# Patient Record
Sex: FEMALE | Race: WHITE | NOT HISPANIC OR LATINO
[De-identification: names, ages, dates, MRNs, and addresses within clinical notes are randomized per-mention and may not be internally consistent; named-entity substitution may affect disease eponyms.]

---

## 2019-02-08 ENCOUNTER — APPOINTMENT (OUTPATIENT)
Dept: CT IMAGING | Facility: CLINIC | Age: 84
End: 2019-02-08

## 2019-03-07 ENCOUNTER — APPOINTMENT (OUTPATIENT)
Dept: CT IMAGING | Facility: CLINIC | Age: 84
End: 2019-03-07
Payer: MEDICARE

## 2019-03-07 ENCOUNTER — OUTPATIENT (OUTPATIENT)
Dept: OUTPATIENT SERVICES | Facility: HOSPITAL | Age: 84
LOS: 1 days | End: 2019-03-07
Payer: MEDICARE

## 2019-03-07 DIAGNOSIS — Z00.8 ENCOUNTER FOR OTHER GENERAL EXAMINATION: ICD-10-CM

## 2019-03-07 DIAGNOSIS — R19.7 DIARRHEA, UNSPECIFIED: ICD-10-CM

## 2019-03-07 PROCEDURE — 74261 CT COLONOGRAPHY DX: CPT

## 2019-03-07 PROCEDURE — 74261 CT COLONOGRAPHY DX: CPT | Mod: 26

## 2022-05-24 ENCOUNTER — APPOINTMENT (OUTPATIENT)
Dept: ORTHOPEDIC SURGERY | Facility: CLINIC | Age: 87
End: 2022-05-24
Payer: MEDICARE

## 2022-05-24 VITALS — WEIGHT: 130 LBS | HEIGHT: 62 IN | BODY MASS INDEX: 23.92 KG/M2

## 2022-05-24 DIAGNOSIS — I10 ESSENTIAL (PRIMARY) HYPERTENSION: ICD-10-CM

## 2022-05-24 PROCEDURE — J3490M: CUSTOM

## 2022-05-24 PROCEDURE — 99214 OFFICE O/P EST MOD 30 MIN: CPT | Mod: 25

## 2022-05-24 PROCEDURE — 20610 DRAIN/INJ JOINT/BURSA W/O US: CPT | Mod: LT

## 2022-05-24 NOTE — DISCUSSION/SUMMARY
[de-identified] : Instructions:  Progress Note completed by Shivani Charles PA-C\par * Dr. Vincent -- The documentation recorded accurately reflects the decisions made by me during this visit.

## 2022-05-24 NOTE — ASSESSMENT
[FreeTextEntry1] : bilateral knee pain with oa.  uses walker.  has braces.  csi on 2/10/22. \par \par also bilateral IT band tightness/tendonitis.

## 2022-05-24 NOTE — HISTORY OF PRESENT ILLNESS
[5] : 5 [0] : 0 [Dull/Aching] : dull/aching [Sharp] : sharp [Retired] : Work status: retired [de-identified] : 5/24/22:  follow up bilateral knees.  reports to pain with walking.  csi on 2/10/22.  [] : Post Surgical Visit: no [FreeTextEntry1] : leia knees

## 2022-05-24 NOTE — PROCEDURE
[FreeTextEntry3] : Procedure Name: Large Joint Injection / Aspiration: Celestone, Lidocaine and Marcaine. \par \par Large Joint Injection was performed because of pain and inflammation. Anesthesia: ethyl chloride sprayed topically.. \par Celestone: An injection of Celestone 6 mg , 1 cc. \par Lidocaine 1%: 3 cc.\par Marcaine 0.25%:  3 cc.\par \par Patient has tried OTC's including aspirin, Ibuprofen, Aleve etc or prescription NSAIDS, and/or exercises at home and/ or physical therapy without satisfactory response and Patient has decreased mobility in the joint. The risks, benefits, and alternatives to cortisone injection were explained in full to the patient. Risks outlined include but are not limited to infection, sepsis, bleeding, scarring, skin discoloration, temporary increase in pain, syncopal episode, failure to resolve symptoms, allergic reaction, symptom recurrence, and elevation of blood sugar in diabetics. Patient understood the risks. All questions were answered.   Oral informed consent was obtained.   Sterile technique was utilized for the procedure including the preparation of the solutions used for the injection. Medication was injected in the BILATERAL KNEES.   Patient tolerated the procedure well. Advised to ice the injection site this evening.  Post Procedure Instructions: Patient was advised to call if redness, pain, or fever occur and apply ice for 15 min. out of every hour for the next 12-24 hours as tolerated. patient was advised to rest the joint(s) for 2 days. \par

## 2022-05-24 NOTE — PHYSICAL EXAM
[Left] : left knee [Right] : right knee [NL (0)] : extension 0 degrees [5___] : hamstring 5[unfilled]/5 [] : no erythema [TWNoteComboBox7] : flexion 110 degrees

## 2023-01-17 ENCOUNTER — APPOINTMENT (OUTPATIENT)
Dept: ORTHOPEDIC SURGERY | Facility: CLINIC | Age: 88
End: 2023-01-17
Payer: MEDICARE

## 2023-01-17 VITALS — HEIGHT: 62 IN | BODY MASS INDEX: 23.92 KG/M2 | WEIGHT: 130 LBS

## 2023-01-17 PROCEDURE — 99214 OFFICE O/P EST MOD 30 MIN: CPT | Mod: 25

## 2023-01-17 PROCEDURE — 20610 DRAIN/INJ JOINT/BURSA W/O US: CPT | Mod: 50

## 2023-01-17 NOTE — DISCUSSION/SUMMARY
[de-identified] : Instructions:  Progress Note completed by Shivani Charles PA-C\par * Dr. Vincent -- The documentation recorded accurately reflects the decisions made by me during this visit.

## 2023-01-17 NOTE — PHYSICAL EXAM
[Left] : left knee [Right] : right knee [NL (0)] : extension 0 degrees [5___] : hamstring 5[unfilled]/5 [] : uses walker [TWNoteComboBox7] : flexion 110 degrees

## 2023-01-17 NOTE — PROCEDURE
[FreeTextEntry3] : Procedure Name: ORTHOVISC (Large Joint)\par \par Viscosupplementation Injection: X-ray evidence of Osteoarthritis on this or prior visit and Patient has tried OTC's including aspirin, Ibuprofen, Aleve etc or prescription NSAIDS, and/or exercises at home and/ or physical therapy without satisfactory response.  The risks, benefits, and alternatives to Viscosupplementation injection were explained in full to the patient. Risks outlined include but are not limited to infection, sepsis, bleeding, scarring, skin discoloration, temporary increase in pain, syncopal episode, failure to resolve symptoms, allergic reaction, and symptom recurrence. Signs and symptoms of infection reviewed and patient advised to call immediately for redness, fevers, and/or chills. Patient understood the risks. All questions were answered. \par \par Oral informed consent was obtained.   Sterile technique was utilized for the procedure including the preparation of the solutions used for the injection. An injection of Orthovisc 2ml #1 was injected into BILATERAL KNEES.  Patient tolerated the procedure well. Advised to ice the injection site this evening.  Post Procedure Instructions: Patient was advised to call if redness, pain, or fever occur and apply ice for 15 min. out of every hour for the next 12-24 hours as tolerated. patient was advised to rest the joint(s) for 2 days.

## 2023-01-17 NOTE — HISTORY OF PRESENT ILLNESS
[8] : 8 [3] : 3 [Dull/Aching] : dull/aching [Sharp] : sharp [de-identified] : 5/24/22:  follow up bilateral knees.  reports to pain with walking.  csi on 2/10/22. \par 1/17/23:  bilateral knee pain.  [] : Post Surgical Visit: no [FreeTextEntry1] : leia knees [de-identified] : physical therapy

## 2023-01-26 ENCOUNTER — APPOINTMENT (OUTPATIENT)
Dept: ORTHOPEDIC SURGERY | Facility: CLINIC | Age: 88
End: 2023-01-26

## 2023-02-02 ENCOUNTER — APPOINTMENT (OUTPATIENT)
Dept: ORTHOPEDIC SURGERY | Facility: CLINIC | Age: 88
End: 2023-02-02
Payer: MEDICARE

## 2023-02-02 VITALS — HEIGHT: 62 IN | WEIGHT: 130 LBS | BODY MASS INDEX: 23.92 KG/M2

## 2023-02-02 PROCEDURE — 20610 DRAIN/INJ JOINT/BURSA W/O US: CPT | Mod: 50

## 2023-02-02 PROCEDURE — 99212 OFFICE O/P EST SF 10 MIN: CPT | Mod: 25

## 2023-02-02 NOTE — HISTORY OF PRESENT ILLNESS
[] : This patient has had an injection before: yes [2] : 2 [Orthovisc] : Orthovisc [de-identified] : 5/24/22:  follow up bilateral knees.  reports to pain with walking.  csi on 2/10/22. \par 1/17/23:  bilateral knee pain. \par 2/2/23:  bilateral knee pain.   no adverse reactions from first set.  [FreeTextEntry1] : bilateral knees  [de-identified] : 01/24/22

## 2023-02-02 NOTE — DISCUSSION/SUMMARY
[de-identified] : Instructions:  Progress Note completed by Shivani Charles PA-C\par * Dr. Vincent -- The documentation recorded accurately reflects the decisions made by me during this visit.

## 2023-02-02 NOTE — PROCEDURE
[FreeTextEntry3] : Procedure Name: ORTHOVISC (Large Joint)\par \par Viscosupplementation Injection: X-ray evidence of Osteoarthritis on this or prior visit and Patient has tried OTC's including aspirin, Ibuprofen, Aleve etc or prescription NSAIDS, and/or exercises at home and/ or physical therapy without satisfactory response.  The risks, benefits, and alternatives to Viscosupplementation injection were explained in full to the patient. Risks outlined include but are not limited to infection, sepsis, bleeding, scarring, skin discoloration, temporary increase in pain, syncopal episode, failure to resolve symptoms, allergic reaction, and symptom recurrence. Signs and symptoms of infection reviewed and patient advised to call immediately for redness, fevers, and/or chills. Patient understood the risks. All questions were answered. \par \par Oral informed consent was obtained.   Sterile technique was utilized for the procedure including the preparation of the solutions used for the injection. An injection of Orthovisc 2ml #2 was injected into BILATERAL KNEES.  Patient tolerated the procedure well. Advised to ice the injection site this evening.  Post Procedure Instructions: Patient was advised to call if redness, pain, or fever occur and apply ice for 15 min. out of every hour for the next 12-24 hours as tolerated. patient was advised to rest the joint(s) for 2 days.

## 2023-02-09 ENCOUNTER — APPOINTMENT (OUTPATIENT)
Dept: ORTHOPEDIC SURGERY | Facility: CLINIC | Age: 88
End: 2023-02-09
Payer: MEDICARE

## 2023-02-09 VITALS — BODY MASS INDEX: 23.92 KG/M2 | HEIGHT: 62 IN | WEIGHT: 130 LBS

## 2023-02-09 PROCEDURE — 99212 OFFICE O/P EST SF 10 MIN: CPT | Mod: 25

## 2023-02-09 PROCEDURE — 20610 DRAIN/INJ JOINT/BURSA W/O US: CPT | Mod: 50

## 2023-02-09 NOTE — DISCUSSION/SUMMARY
[de-identified] : Instructions:  Progress Note completed by Shivani Charles PA-C\par * Dr. Vincent -- The documentation recorded accurately reflects the decisions made by me during this visit.

## 2023-02-09 NOTE — PROCEDURE
[FreeTextEntry3] : Procedure Name: ORTHOVISC (Large Joint)\par \par Viscosupplementation Injection: X-ray evidence of Osteoarthritis on this or prior visit and Patient has tried OTC's including aspirin, Ibuprofen, Aleve etc or prescription NSAIDS, and/or exercises at home and/ or physical therapy without satisfactory response.  The risks, benefits, and alternatives to Viscosupplementation injection were explained in full to the patient. Risks outlined include but are not limited to infection, sepsis, bleeding, scarring, skin discoloration, temporary increase in pain, syncopal episode, failure to resolve symptoms, allergic reaction, and symptom recurrence. Signs and symptoms of infection reviewed and patient advised to call immediately for redness, fevers, and/or chills. Patient understood the risks. All questions were answered. \par \par Oral informed consent was obtained.   Sterile technique was utilized for the procedure including the preparation of the solutions used for the injection. An injection of Orthovisc 2ml #3 was injected into BILATERAL KNEES.  Patient tolerated the procedure well. Advised to ice the injection site this evening.  Post Procedure Instructions: Patient was advised to call if redness, pain, or fever occur and apply ice for 15 min. out of every hour for the next 12-24 hours as tolerated. patient was advised to rest the joint(s) for 2 days.

## 2023-02-09 NOTE — HISTORY OF PRESENT ILLNESS
[] : This patient has had an injection before: yes [3] : 3 [Orthovisc] : Orthovisc [de-identified] : 5/24/22:  follow up bilateral knees.  reports to pain with walking.  csi on 2/10/22. \par 1/17/23:  bilateral knee pain. \par 2/2/23:  bilateral knee pain.   no adverse reactions from first set. \par 2/9/23:  bilateral knee pain.  [FreeTextEntry1] : bilateral knees

## 2023-02-16 ENCOUNTER — APPOINTMENT (OUTPATIENT)
Dept: ORTHOPEDIC SURGERY | Facility: CLINIC | Age: 88
End: 2023-02-16
Payer: MEDICARE

## 2023-02-16 VITALS — BODY MASS INDEX: 23.92 KG/M2 | HEIGHT: 62 IN | WEIGHT: 130 LBS

## 2023-02-16 PROCEDURE — 99212 OFFICE O/P EST SF 10 MIN: CPT | Mod: 25

## 2023-02-16 PROCEDURE — 20610 DRAIN/INJ JOINT/BURSA W/O US: CPT | Mod: 50

## 2023-02-16 NOTE — HISTORY OF PRESENT ILLNESS
[] : This patient has had an injection before: yes [Orthovisc] : Orthovisc [3] : 3 [de-identified] : 5/24/22:  follow up bilateral knees.  reports to pain with walking.  csi on 2/10/22. \par 1/17/23:  bilateral knee pain. \par 2/2/23:  bilateral knee pain.   no adverse reactions from first set. \par 2/9/23:  bilateral knee pain. \par 2/16/23: B knee orthovisc #4 [FreeTextEntry1] : bilateral knees  [de-identified] : 02/09/23

## 2023-02-16 NOTE — PROCEDURE
[FreeTextEntry3] : Procedure Name: ORTHOVISC (Large Joint)\par \par Viscosupplementation Injection: X-ray evidence of Osteoarthritis on this or prior visit and Patient has tried OTC's including aspirin, Ibuprofen, Aleve etc or prescription NSAIDS, and/or exercises at home and/ or physical therapy without satisfactory response.  The risks, benefits, and alternatives to Viscosupplementation injection were explained in full to the patient. Risks outlined include but are not limited to infection, sepsis, bleeding, scarring, skin discoloration, temporary increase in pain, syncopal episode, failure to resolve symptoms, allergic reaction, and symptom recurrence. Signs and symptoms of infection reviewed and patient advised to call immediately for redness, fevers, and/or chills. Patient understood the risks. All questions were answered. \par \par Oral informed consent was obtained.   Sterile technique was utilized for the procedure including the preparation of the solutions used for the injection. An injection of Orthovisc 2ml #4 was injected into BILATERAL KNEES.  Patient tolerated the procedure well. Advised to ice the injection site this evening.  Post Procedure Instructions: Patient was advised to call if redness, pain, or fever occur and apply ice for 15 min. out of every hour for the next 12-24 hours as tolerated. patient was advised to rest the joint(s) for 2 days.

## 2023-02-16 NOTE — DISCUSSION/SUMMARY
[de-identified] : Instructions:  Progress Note completed by JAKE Rosenthal PA-C\par * Dr. Vincent -- The documentation recorded accurately reflects the decisions made by me during this visit.

## 2023-07-25 ENCOUNTER — APPOINTMENT (OUTPATIENT)
Dept: ORTHOPEDIC SURGERY | Facility: CLINIC | Age: 88
End: 2023-07-25
Payer: MEDICARE

## 2023-07-25 VITALS — WEIGHT: 130 LBS | HEIGHT: 62 IN | BODY MASS INDEX: 23.92 KG/M2

## 2023-07-25 DIAGNOSIS — M17.11 UNILATERAL PRIMARY OSTEOARTHRITIS, RIGHT KNEE: ICD-10-CM

## 2023-07-25 DIAGNOSIS — M17.12 UNILATERAL PRIMARY OSTEOARTHRITIS, LEFT KNEE: ICD-10-CM

## 2023-07-25 PROCEDURE — J3490M: CUSTOM | Mod: NC

## 2023-07-25 PROCEDURE — 20610 DRAIN/INJ JOINT/BURSA W/O US: CPT | Mod: 50

## 2023-07-25 PROCEDURE — 99214 OFFICE O/P EST MOD 30 MIN: CPT | Mod: 25

## 2023-07-25 NOTE — PROCEDURE
[Large Joint Injection] : Large joint injection [Bilateral] : bilaterally of the [Knee] : knee [Pain] : pain [Alcohol] : alcohol [___ cc    3mg] :  Betamethasone (Celestone) ~Vcc of 3mg [Betadine] : betadine [___ cc    1%] : Lidocaine ~Vcc of 1%  [___ cc    0.25%] : Bupivacaine (Marcaine) ~Vcc of 0.25%  [] : Patient tolerated procedure well [Call if redness, pain or fever occur] : call if redness, pain or fever occur [Apply ice for 15min out of every hour for the next 12-24 hours as tolerated] : apply ice for 15 minutes out of every hour for the next 12-24 hours as tolerated [Patient was advised to rest the joint(s) for ____ days] : patient was advised to rest the joint(s) for [unfilled] days [Previous OTC use and PT nontherapeutic] : patient has tried OTC's including aspirin, Ibuprofen, Aleve, etc or prescription NSAIDS, and/or exercises at home and/or physical therapy without satisfactory response [Patient had decreased mobility in the joint] : patient had decreased mobility in the joint [Risks, benefits, alternatives discussed / Verbal consent obtained] : the risks benefits, and alternatives have been discussed, and verbal consent was obtained [All ultrasound images have been permanently captured and stored accordingly in our picture archiving and communication system] : All ultrasound images have been permanently captured and stored accordingly in our picture archiving and communication system [Visualization of the needle and placement of injection was performed without complication] : visualization of the needle and placement of injection was performed without complication [Inflammation] : inflammation [de-identified] : for accurate placement of needle into knee joint

## 2023-07-25 NOTE — HISTORY OF PRESENT ILLNESS
[8] : 8 [de-identified] : 5/24/22:  follow up bilateral knees.  reports to pain with walking.  csi on 2/10/22. \par 1/17/23:  bilateral knee pain. \par 2/2/23:  bilateral knee pain.   no adverse reactions from first set. \par 2/9/23:  bilateral knee pain. \par 2/16/23: B knee orthovisc #4\par 7/25/23:  bilateral knee pain has returned.  visco helped. [] : This patient has had an injection before: yes [3] : 3 [Orthovisc] : Orthovisc [FreeTextEntry1] : bilateral knees  [de-identified] : 02/09/23

## 2023-09-05 ENCOUNTER — APPOINTMENT (OUTPATIENT)
Dept: ORTHOPEDIC SURGERY | Facility: CLINIC | Age: 88
End: 2023-09-05

## 2024-01-21 ENCOUNTER — INPATIENT (INPATIENT)
Facility: HOSPITAL | Age: 89
LOS: 2 days | Discharge: NURSING, REHAB OR LONG-TERM CARE | End: 2024-01-24
Attending: INTERNAL MEDICINE | Admitting: INTERNAL MEDICINE
Payer: MEDICARE

## 2024-01-21 VITALS
DIASTOLIC BLOOD PRESSURE: 84 MMHG | HEIGHT: 66 IN | RESPIRATION RATE: 19 BRPM | OXYGEN SATURATION: 97 % | WEIGHT: 169.98 LBS | HEART RATE: 90 BPM | SYSTOLIC BLOOD PRESSURE: 145 MMHG | TEMPERATURE: 99 F

## 2024-01-21 LAB
ALBUMIN SERPL ELPH-MCNC: 3.2 G/DL — LOW (ref 3.3–5)
ALP SERPL-CCNC: 71 U/L — SIGNIFICANT CHANGE UP (ref 40–120)
ALT FLD-CCNC: 29 U/L — SIGNIFICANT CHANGE UP (ref 12–78)
ANION GAP SERPL CALC-SCNC: 6 MMOL/L — SIGNIFICANT CHANGE UP (ref 5–17)
APPEARANCE UR: CLEAR — SIGNIFICANT CHANGE UP
APTT BLD: 29 SEC — SIGNIFICANT CHANGE UP (ref 24.5–35.6)
AST SERPL-CCNC: 32 U/L — SIGNIFICANT CHANGE UP (ref 15–37)
BASOPHILS # BLD AUTO: 0.07 K/UL — SIGNIFICANT CHANGE UP (ref 0–0.2)
BASOPHILS NFR BLD AUTO: 0.7 % — SIGNIFICANT CHANGE UP (ref 0–2)
BILIRUB SERPL-MCNC: 0.9 MG/DL — SIGNIFICANT CHANGE UP (ref 0.2–1.2)
BILIRUB UR-MCNC: NEGATIVE — SIGNIFICANT CHANGE UP
BUN SERPL-MCNC: 21 MG/DL — SIGNIFICANT CHANGE UP (ref 7–23)
CALCIUM SERPL-MCNC: 9 MG/DL — SIGNIFICANT CHANGE UP (ref 8.5–10.1)
CHLORIDE SERPL-SCNC: 105 MMOL/L — SIGNIFICANT CHANGE UP (ref 96–108)
CHOLEST SERPL-MCNC: 182 MG/DL — SIGNIFICANT CHANGE UP
CO2 SERPL-SCNC: 30 MMOL/L — SIGNIFICANT CHANGE UP (ref 22–31)
COLOR SPEC: YELLOW — SIGNIFICANT CHANGE UP
CREAT SERPL-MCNC: 0.75 MG/DL — SIGNIFICANT CHANGE UP (ref 0.5–1.3)
DIFF PNL FLD: NEGATIVE — SIGNIFICANT CHANGE UP
EGFR: 73 ML/MIN/1.73M2 — SIGNIFICANT CHANGE UP
EOSINOPHIL # BLD AUTO: 0.37 K/UL — SIGNIFICANT CHANGE UP (ref 0–0.5)
EOSINOPHIL NFR BLD AUTO: 3.6 % — SIGNIFICANT CHANGE UP (ref 0–6)
FLUAV AG NPH QL: SIGNIFICANT CHANGE UP
FLUBV AG NPH QL: SIGNIFICANT CHANGE UP
GLUCOSE BLDC GLUCOMTR-MCNC: 86 MG/DL — SIGNIFICANT CHANGE UP (ref 70–99)
GLUCOSE SERPL-MCNC: 88 MG/DL — SIGNIFICANT CHANGE UP (ref 70–99)
GLUCOSE UR QL: NEGATIVE MG/DL — SIGNIFICANT CHANGE UP
HCT VFR BLD CALC: 49.2 % — HIGH (ref 34.5–45)
HDLC SERPL-MCNC: 48 MG/DL — LOW
HGB BLD-MCNC: 16.3 G/DL — HIGH (ref 11.5–15.5)
IMM GRANULOCYTES NFR BLD AUTO: 0.7 % — SIGNIFICANT CHANGE UP (ref 0–0.9)
INR BLD: 1 RATIO — SIGNIFICANT CHANGE UP (ref 0.85–1.18)
KETONES UR-MCNC: NEGATIVE MG/DL — SIGNIFICANT CHANGE UP
LEUKOCYTE ESTERASE UR-ACNC: NEGATIVE — SIGNIFICANT CHANGE UP
LIPID PNL WITH DIRECT LDL SERPL: 109 MG/DL — HIGH
LYMPHOCYTES # BLD AUTO: 0.9 K/UL — LOW (ref 1–3.3)
LYMPHOCYTES # BLD AUTO: 8.8 % — LOW (ref 13–44)
MCHC RBC-ENTMCNC: 31.8 PG — SIGNIFICANT CHANGE UP (ref 27–34)
MCHC RBC-ENTMCNC: 33.1 G/DL — SIGNIFICANT CHANGE UP (ref 32–36)
MCV RBC AUTO: 95.9 FL — SIGNIFICANT CHANGE UP (ref 80–100)
MONOCYTES # BLD AUTO: 0.88 K/UL — SIGNIFICANT CHANGE UP (ref 0–0.9)
MONOCYTES NFR BLD AUTO: 8.6 % — SIGNIFICANT CHANGE UP (ref 2–14)
NEUTROPHILS # BLD AUTO: 7.98 K/UL — HIGH (ref 1.8–7.4)
NEUTROPHILS NFR BLD AUTO: 77.6 % — HIGH (ref 43–77)
NITRITE UR-MCNC: NEGATIVE — SIGNIFICANT CHANGE UP
NON HDL CHOLESTEROL: 134 MG/DL — HIGH
NRBC # BLD: 0 /100 WBCS — SIGNIFICANT CHANGE UP (ref 0–0)
PH UR: 7.5 — SIGNIFICANT CHANGE UP (ref 5–8)
PLATELET # BLD AUTO: 158 K/UL — SIGNIFICANT CHANGE UP (ref 150–400)
POTASSIUM SERPL-MCNC: 4.7 MMOL/L — SIGNIFICANT CHANGE UP (ref 3.5–5.3)
POTASSIUM SERPL-SCNC: 4.7 MMOL/L — SIGNIFICANT CHANGE UP (ref 3.5–5.3)
PROT SERPL-MCNC: 6.9 GM/DL — SIGNIFICANT CHANGE UP (ref 6–8.3)
PROT UR-MCNC: SIGNIFICANT CHANGE UP MG/DL
PROTHROM AB SERPL-ACNC: 12 SEC — SIGNIFICANT CHANGE UP (ref 9.5–13)
RBC # BLD: 5.13 M/UL — SIGNIFICANT CHANGE UP (ref 3.8–5.2)
RBC # FLD: 13.5 % — SIGNIFICANT CHANGE UP (ref 10.3–14.5)
SARS-COV-2 RNA SPEC QL NAA+PROBE: DETECTED
SODIUM SERPL-SCNC: 141 MMOL/L — SIGNIFICANT CHANGE UP (ref 135–145)
SP GR SPEC: >1.03 — HIGH (ref 1–1.03)
TRIGL SERPL-MCNC: 143 MG/DL — SIGNIFICANT CHANGE UP
TROPONIN I, HIGH SENSITIVITY RESULT: 22.7 NG/L — SIGNIFICANT CHANGE UP
UROBILINOGEN FLD QL: 1 MG/DL — SIGNIFICANT CHANGE UP (ref 0.2–1)
WBC # BLD: 10.27 K/UL — SIGNIFICANT CHANGE UP (ref 3.8–10.5)
WBC # FLD AUTO: 10.27 K/UL — SIGNIFICANT CHANGE UP (ref 3.8–10.5)

## 2024-01-21 PROCEDURE — 99222 1ST HOSP IP/OBS MODERATE 55: CPT

## 2024-01-21 PROCEDURE — 70496 CT ANGIOGRAPHY HEAD: CPT | Mod: 26,MA

## 2024-01-21 PROCEDURE — 99223 1ST HOSP IP/OBS HIGH 75: CPT | Mod: AI

## 2024-01-21 PROCEDURE — 0042T: CPT | Mod: MA

## 2024-01-21 PROCEDURE — 93010 ELECTROCARDIOGRAM REPORT: CPT

## 2024-01-21 PROCEDURE — 70450 CT HEAD/BRAIN W/O DYE: CPT | Mod: 26,MA,XU

## 2024-01-21 PROCEDURE — 70498 CT ANGIOGRAPHY NECK: CPT | Mod: 26,MA

## 2024-01-21 PROCEDURE — 99291 CRITICAL CARE FIRST HOUR: CPT

## 2024-01-21 RX ORDER — ASPIRIN/CALCIUM CARB/MAGNESIUM 324 MG
81 TABLET ORAL DAILY
Refills: 0 | Status: DISCONTINUED | OUTPATIENT
Start: 2024-01-21 | End: 2024-01-22

## 2024-01-21 RX ORDER — SODIUM CHLORIDE 9 MG/ML
1000 INJECTION, SOLUTION INTRAVENOUS
Refills: 0 | Status: DISCONTINUED | OUTPATIENT
Start: 2024-01-21 | End: 2024-01-24

## 2024-01-21 RX ORDER — ATORVASTATIN CALCIUM 80 MG/1
40 TABLET, FILM COATED ORAL AT BEDTIME
Refills: 0 | Status: DISCONTINUED | OUTPATIENT
Start: 2024-01-21 | End: 2024-01-22

## 2024-01-21 RX ORDER — METOPROLOL TARTRATE 50 MG
5 TABLET ORAL EVERY 6 HOURS
Refills: 0 | Status: DISCONTINUED | OUTPATIENT
Start: 2024-01-21 | End: 2024-01-24

## 2024-01-21 RX ORDER — HEPARIN SODIUM 5000 [USP'U]/ML
5000 INJECTION INTRAVENOUS; SUBCUTANEOUS EVERY 8 HOURS
Refills: 0 | Status: DISCONTINUED | OUTPATIENT
Start: 2024-01-21 | End: 2024-01-24

## 2024-01-21 RX ORDER — ASPIRIN/CALCIUM CARB/MAGNESIUM 324 MG
300 TABLET ORAL ONCE
Refills: 0 | Status: COMPLETED | OUTPATIENT
Start: 2024-01-21 | End: 2024-01-21

## 2024-01-21 RX ADMIN — Medication 5 MILLIGRAM(S): at 18:48

## 2024-01-21 RX ADMIN — HEPARIN SODIUM 5000 UNIT(S): 5000 INJECTION INTRAVENOUS; SUBCUTANEOUS at 21:37

## 2024-01-21 RX ADMIN — HEPARIN SODIUM 5000 UNIT(S): 5000 INJECTION INTRAVENOUS; SUBCUTANEOUS at 14:17

## 2024-01-21 RX ADMIN — SODIUM CHLORIDE 60 MILLILITER(S): 9 INJECTION, SOLUTION INTRAVENOUS at 15:21

## 2024-01-21 RX ADMIN — Medication 300 MILLIGRAM(S): at 10:52

## 2024-01-21 RX ADMIN — SODIUM CHLORIDE 60 MILLILITER(S): 9 INJECTION, SOLUTION INTRAVENOUS at 21:37

## 2024-01-21 NOTE — ED PROVIDER NOTE - PHYSICAL EXAMINATION
Gen: alert but oriented x 0-1  Head: NCAT  ENT: Airway patent, moist mucous membranes, nasal passageways clear   Cardiac: Normal rate,  irregular rhythm   Respiratory: Lungs CTA B/L  Gastrointestinal: Abdomen soft, nontender, nondistended, no rebound, no guarding  MSK: No gross abnormalities, FROM of all four extremities, no edema  HEME: Extremities warm and well perfused   Skin: No rashes, no lesions  Neuro: Unable to assess gait - + no movement R arm or R leg, + mild drift left hand but cannot fully assess as pt does not fully follow commands but strength grossly wnl, + mild drift left leg + mild movement left leg + Left gaze forced deviation, + aphasic/ mute

## 2024-01-21 NOTE — ED ADULT NURSE REASSESSMENT NOTE - NS ED NURSE REASSESS COMMENT FT1
Pt resting comfortably at this time. No s/s of pain noted. Continuous SPO2 and cardiac monitoring in place, fluids infusing per order. Son at bedside and oral care performed. Awaiting bed for admission, plan of care ongoing.

## 2024-01-21 NOTE — CONSULT NOTE ADULT - ASSESSMENT
Exam findings indicative of a likely extensive L MCA territory stroke.  Radiographic findings indicate early changes of ischemic infarct in that territory.      Suspect embolic etiology.         Exam findings indicative of a likely extensive L MCA territory stroke.  Radiographic findings indicate early changes of ischemic infarct in that territory.      Not a "small vessel" infarct.    She has been started on ASA. Antiplatelet Rx unlikley to be relevant for her stroke, therefore would not add a second antiplatelet agent (which would increase risk, without expected benefit).     Suspect embolic etiology.      MR would not  at this point.    Risk of significant post-infarct cerebral edema developing in the next 2 to 5 days.     Discussed at length with sonn at bedside.       RECOMMENDATIONS    OK for now to continue ASA; no second antiplatelet agent.    TTE.    Cardiac telemetry.      Swallow evaluation.    Routine repeat non-con head CT 1/22/24 in the PM, or on 1/23/24.    STAT non-con head CT for neurologic deterioration.          Mauricio Meyers M.D.   - Department of Neurology  [facility] [employer] [facility]

## 2024-01-21 NOTE — H&P ADULT - ASSESSMENT
89 year old female PMH HTN, Sciatica, possible mild dementia (no official dx), who presents with complaint of AMS - this morning not speaking or following commands, lethargic, last known well at approx 9PM evening prior (~11 hours prior to arrival), reportedly generalized weakness/ decreased ambulation over 1 week and fatigue, does not ambulate much at baseline. Able to speak, converse at baseline. Today not speaking at all. No falls or trauma. Not on any blood thinners per family's knowledge. Recently treated for UTI.    Plan:  89 year old female PMH HTN, Sciatica, possible mild dementia (no official dx), who presents with complaint of AMS - this morning not speaking or following commands, lethargic, last known well at approx 9PM evening prior (~11 hours prior to arrival), reportedly generalized weakness/ decreased ambulation over 1 week and fatigue, does not ambulate much at baseline. Able to speak, converse at baseline. Today not speaking at all. No falls or trauma. Not on any blood thinners per family's knowledge. Recently treated for UTI.      Plan:  Admit to tele for AMS likely from new CVA. CT head notes new left MCA infract, no bleed, will likely have aphasia. Swallow eval requested, does not appear she will pass. IVF for now. Will not order brain MRI, not likely to . Change home Verapamil to IVP Lopressor, IVF while NPO until Swallow eval complete. Covid 19 positive, no treatment needed.     Added ASA and statin. Neuro was called.     Son agrees to full DNR/DNI, agree to Palliative Care eval and home hospice later this week.  Will sign MOLST form in AM.

## 2024-01-21 NOTE — ED PROVIDER NOTE - PROGRESS NOTE DETAILS
Bradley DO: after review of imaging/ discussion with interventional stroke neurology - not candidate for thrombectomy, d/w tele stroke DR Alford and recommending transfer to hospital with dedicated stroke unit Rbadley DO: d/w Dr Alford again, after discussion with neuro at [facility] and [facility], recommendation to admit here at [facility] with q4 hour neuro checks as no intervention offered, consulted neurology Dr Meyers who will see pt, family updated at bedside sons Kevin and Sae and daughter in law Bradley DO: after review of imaging/ discussion with interventional stroke neurology - not candidate for thrombectomy, d/w tele stroke DR Alford and will discuss w/ her director about poss transfer to hospital with dedicated stroke unit, recommended holding anticoagulation but ok for aspirin

## 2024-01-21 NOTE — ED ADULT NURSE NOTE - NURSING NEURO LEVEL OF CONSCIOUSNESS
How Severe Are Your Spot(S)?: mild
What Type Of Note Output Would You Prefer (Optional)?: Standard Output
What Is The Reason For Today's Visit?: Full Body Skin Examination
What Is The Reason For Today's Visit? (Being Monitored For X): concerning skin lesions on an annual basis
awake

## 2024-01-21 NOTE — ED ADULT NURSE NOTE - ED STAT RN HANDOFF DETAILS
Report endorsed to onceran Christy. Safety checks compld this shift/Safety rounds completed hourly.  IV sites checked +remains WDL. Meds given as ord with no s/s of adverse RXNs. Fall +skin precs in place. Any issues endorsed to onceran Christy for follow up.

## 2024-01-21 NOTE — ED ADULT NURSE NOTE - NSFALLFACTORS_ED_ALL_ED
Addended by: Deloris Borges on: 10/15/2021 11:08 AM     Modules accepted: Orders Confusion/AMS/Weakness

## 2024-01-21 NOTE — H&P ADULT - HISTORY OF PRESENT ILLNESS
89 year old female PMH HTN, Sciatica, possible mild dementia (no official dx), who presents with complaint of AMS - this morning not speaking or following commands, lethargic, last known well at approx 9PM evening prior (~11 hours prior to arrival), reportedly generalized weakness/ decreased ambulation over 1 week and fatigue, does not ambulate much at baseline. Able to speak, converse at baseline. Today not speaking at all. No falls or trauma. Not on any blood thinners per family's knowledge. Recently treated for UTI.  89 year old female PMH HTN, chronic A.fib has refused AC in the past, possible mild dementia (no official dx), who presents with complaint of AMS - this morning not speaking or following commands, lethargic, last known well at approx 9PM evening prior (~11 hours prior to arrival), reportedly generalized weakness/ decreased ambulation over 1 week and fatigue, does not ambulate much at baseline. Able to speak, converse at baseline. Today not speaking at all. No falls or trauma. Not on any blood thinners per family's knowledge. Recently treated for UTI. At baseline basically non-ambulatory, small distances with HHA at home.

## 2024-01-21 NOTE — ED ADULT TRIAGE NOTE - CHIEF COMPLAINT QUOTE
BIBA- from home  Went to be last night at 9pm normal- Usually AOX2 forgetful and verbal  This am unable to be awakened. Left facial droop and right arm drift as per EMS  EMS FS 99, Left hand #20g  HX HTN, neuropathy, CHF, on ATB for UTI, unknown allergy status

## 2024-01-21 NOTE — ED PROVIDER NOTE - OBJECTIVE STATEMENT
89F pmhx HTN, sciatica, possible mild dementia (no official dx), who presents with complaint of AMS - this morning not speaking or following commands, lethargic, last known well at approx 9PM evening prior (~11 hours prior to arrival), reportedly generalized weakness/ decreased ambulation over 1 week and fatigue, does not ambulate much at baseline independently but at baseline speaks/ converses. Today not speaking at all. No falls or trauma. Not on any blood thinners per family's knowledge. Recently treated for UTI

## 2024-01-21 NOTE — ED ADULT NURSE NOTE - OBJECTIVE STATEMENT
Pt BIBEMS code stroke at triage and assessed by Dr. Bradley. Per EMS pt went to bed last night as baseline and woke up this morning nonverbal, with L facial droop, L visual gaze, and R sided weakness. Per family pt has hx of Dementia and confused at times but normally able to speak and AOx1. EMS placed L top of hand IV #20 on field and second IV placed to L forearm #20 at triage by this RN. Pt not following commands at this time, unable to answer questions. PMH Dementia, HTN, CHF, per family on abx for current UTI.

## 2024-01-21 NOTE — CONSULT NOTE ADULT - SUBJECTIVE AND OBJECTIVE BOX
BIBEMS from home this morning.  History from Admission H&P    <Start of quote(s) from H&P>  "Reason for Admission: AMS, r/o CVA  History of Present Illness:   89 year old female PMH HTN, chronic A.fib has refused AC in the past, possible mild dementia (no official dx), who presents with complaint of AMS - this morning not speaking or following commands, lethargic, last known well at approx 9PM evening prior (~11 hours prior to arrival), reportedly generalized weakness/ decreased ambulation over 1 week and fatigue, does not ambulate much at baseline. Able to speak, converse at baseline. Today not speaking at all. No falls or trauma. Not on any blood thinners per family's knowledge. Recently treated for UTI. At baseline basically non-ambulatory, small distances with HHA at home.       Review of Systems:  Other Review of Systems: All other review of systems negative, except as noted in HPI   . . .     Home Medications:   * Outpatient Medication Status not yet specified   . . .   · Substance use	No"  <End of quote(s) from H&P>    Per radiology report of head CT, CTP, and CTAs:        EXAMINATION    Supine in bed in Tele unit.  Non-verbal. Does not follow verbal requests.  Gaze deviated to the L.  No visual threat response elicited.  Flaccid RUE.      Winces readily to irritating stimulation of the LUE and LLE, and withdraws.  Delayed and less obvious wincing to irritating stimulation of the RUE and RLE.  No RUE withdrawal; weak RLE withdrawal.

## 2024-01-21 NOTE — ED ADULT NURSE NOTE - NSFALLHARMRISKINTERV_ED_ALL_ED

## 2024-01-21 NOTE — H&P ADULT - NSHPLABSRESULTS_GEN_ALL_CORE
LABS:                        16.3   10.27 )-----------( 158      ( 21 Jan 2024 09:05 )             49.2     01-21    141  |  105  |  21  ----------------------------<  88  4.7   |  30  |  0.75    Ca    9.0      21 Jan 2024 09:05    TPro  6.9  /  Alb  3.2<L>  /  TBili  0.9  /  DBili  x   /  AST  32  /  ALT  29  /  AlkPhos  71  01-21    PT/INR - ( 21 Jan 2024 09:05 )   PT: 12.0 sec;   INR: 1.00 ratio         PTT - ( 21 Jan 2024 09:05 )  PTT:29.0 sec  Urinalysis Basic - ( 21 Jan 2024 09:05 )    Color: x / Appearance: x / SG: x / pH: x  Gluc: 88 mg/dL / Ketone: x  / Bili: x / Urobili: x   Blood: x / Protein: x / Nitrite: x   Leuk Esterase: x / RBC: x / WBC x   Sq Epi: x / Non Sq Epi: x / Bacteria: x          RADIOLOGY & ADDITIONAL TESTS:

## 2024-01-21 NOTE — ED PROVIDER NOTE - CLINICAL SUMMARY MEDICAL DECISION MAKING FREE TEXT BOX
89F pmhx HTN, sciatica, possible mild dementia (no official dx), who presents with complaint of AMS - this morning not speaking or following commands, lethargic, last known well at approx 9PM evening prior (~11 hours prior to arrival), reportedly generalized weakness/ decreased ambulation over 1 week and fatigue, does not ambulate much at baseline independently but at baseline speaks/ converses. Today not speaking at all. No falls or trauma. Not on any blood thinners per family's knowledge. Recently treated for UTI  - stroke code activated to rule out LVO, not TNK candidate, rule out underlying infcn/ metabolic derangements, afib on ekg - d/w neuro due to occlusion hold on AC but ok with starting asa, NPO, admission vs transfer

## 2024-01-21 NOTE — H&P ADULT - NSHPPHYSICALEXAM_GEN_ALL_CORE
PHYSICAL EXAMINATION:  Vital Signs Last 24 Hrs  T(C): 36.8 (21 Jan 2024 11:42), Max: 37.1 (21 Jan 2024 08:32)  T(F): 98.2 (21 Jan 2024 11:42), Max: 98.7 (21 Jan 2024 08:32)  HR: 82 (21 Jan 2024 11:42) (82 - 93)  BP: 163/108 (21 Jan 2024 11:42) (145/84 - 165/86)  BP(mean): --  RR: 18 (21 Jan 2024 11:42) (18 - 19)  SpO2: 97% (21 Jan 2024 11:42) (97% - 97%)    Parameters below as of 21 Jan 2024 08:32  Patient On (Oxygen Delivery Method): room air      CAPILLARY BLOOD GLUCOSE      POCT Blood Glucose.: 93 mg/dL (21 Jan 2024 08:30)      GENERAL: NAD,   ENMT: mucous membranes moist  NECK: supple, No JVD  CHEST/LUNG: clear to auscultation bilaterally; no rales, rhonchi, or wheezing b/l  HEART: normal S1, S2  ABDOMEN: BS+, soft, ND, NT   EXTREMITIES:  pulses palpable; no clubbing, cyanosis, or edema b/l LEs  NEURO: awake, alert, interactive; moves all extremities PHYSICAL EXAMINATION:  Vital Signs Last 24 Hrs  T(C): 36.8 (21 Jan 2024 11:42), Max: 37.1 (21 Jan 2024 08:32)  T(F): 98.2 (21 Jan 2024 11:42), Max: 98.7 (21 Jan 2024 08:32)  HR: 82 (21 Jan 2024 11:42) (82 - 93)  BP: 163/108 (21 Jan 2024 11:42) (145/84 - 165/86)  BP(mean): --  RR: 18 (21 Jan 2024 11:42) (18 - 19)  SpO2: 97% (21 Jan 2024 11:42) (97% - 97%)    Parameters below as of 21 Jan 2024 08:32  Patient On (Oxygen Delivery Method): room air      CAPILLARY BLOOD GLUCOSE      POCT Blood Glucose.: 93 mg/dL (21 Jan 2024 08:30)      GENERAL: NAD, seen in ER, son at bedside, non-verbal, eyes closed, not responding to name, no CP or SOB  Neuro status not fully assessable due to weakness and lethargy   ENMT: mucous membranes moist  NECK: supple, No JVD  CHEST/LUNG: clear to auscultation bilaterally; no rales, rhonchi, or wheezing b/l  HEART: normal S1, S2  ABDOMEN: BS+, soft, ND, NT   EXTREMITIES:  pulses palpable; no clubbing, cyanosis, or edema b/l LEs  NEURO: sleeping in bed, non-verbal

## 2024-01-22 DIAGNOSIS — U07.1 COVID-19: ICD-10-CM

## 2024-01-22 DIAGNOSIS — R53.81 OTHER MALAISE: ICD-10-CM

## 2024-01-22 DIAGNOSIS — Z51.5 ENCOUNTER FOR PALLIATIVE CARE: ICD-10-CM

## 2024-01-22 DIAGNOSIS — I63.9 CEREBRAL INFARCTION, UNSPECIFIED: ICD-10-CM

## 2024-01-22 LAB
CULTURE RESULTS: SIGNIFICANT CHANGE UP
SPECIMEN SOURCE: SIGNIFICANT CHANGE UP

## 2024-01-22 PROCEDURE — 99232 SBSQ HOSP IP/OBS MODERATE 35: CPT

## 2024-01-22 PROCEDURE — 99223 1ST HOSP IP/OBS HIGH 75: CPT | Mod: FS

## 2024-01-22 RX ADMIN — Medication 5 MILLIGRAM(S): at 05:32

## 2024-01-22 RX ADMIN — HEPARIN SODIUM 5000 UNIT(S): 5000 INJECTION INTRAVENOUS; SUBCUTANEOUS at 23:28

## 2024-01-22 RX ADMIN — SODIUM CHLORIDE 60 MILLILITER(S): 9 INJECTION, SOLUTION INTRAVENOUS at 08:18

## 2024-01-22 RX ADMIN — SODIUM CHLORIDE 60 MILLILITER(S): 9 INJECTION, SOLUTION INTRAVENOUS at 23:28

## 2024-01-22 RX ADMIN — HEPARIN SODIUM 5000 UNIT(S): 5000 INJECTION INTRAVENOUS; SUBCUTANEOUS at 13:01

## 2024-01-22 RX ADMIN — HEPARIN SODIUM 5000 UNIT(S): 5000 INJECTION INTRAVENOUS; SUBCUTANEOUS at 05:32

## 2024-01-22 RX ADMIN — Medication 5 MILLIGRAM(S): at 00:33

## 2024-01-22 RX ADMIN — Medication 5 MILLIGRAM(S): at 13:01

## 2024-01-22 RX ADMIN — Medication 5 MILLIGRAM(S): at 23:28

## 2024-01-22 RX ADMIN — Medication 5 MILLIGRAM(S): at 19:50

## 2024-01-22 NOTE — CONSULT NOTE ADULT - ASSESSMENT
89 year old female PMH of HTN, atrial fib (inconsistent AC use?), CVA presented to the ED for AMS.  Patient unable to follow commands, lethargic and unable to speak.  Patient with left MCA infarct.  Palliative care consulted for GOC.

## 2024-01-22 NOTE — ED ADULT NURSE REASSESSMENT NOTE - NS ED NURSE REASSESS COMMENT FT1
Pt received in stretcher with eyes closed, equal rise and fall of chest wall noted breathing unlabored. Pt responds to physical stimuli, nonverbal at this time and unable to follow commands. Pt remains connected to cardiac monitor, with son at bedside. Plan of care reviewed with son. Safety maintained, assessment ongoing.

## 2024-01-22 NOTE — ED ADULT NURSE REASSESSMENT NOTE - NS ED NURSE REASSESS COMMENT FT1
MD Ariel Zhao made aware of bladder scan results. Per MD order contact inhouse NP for escamilla placement order. Inhouse NP paged awaiting response.

## 2024-01-22 NOTE — CONSULT NOTE ADULT - PROBLEM SELECTOR RECOMMENDATION 9
CTH: Abnormal perfusion showing left middle cerebral artery territory acute infarct with surrounding penumbra as described above.  chronic microvascular ischemic changes   h/o atrial fib (documented inconsistent use of AC), COVID all contributing to prothrombotic state   lethargic, unable to speak, able to squeeze hand on command, right facial droop  NPO, speech and swallow eval pending, aspiration precautions  on asa and heparin sq

## 2024-01-22 NOTE — CONSULT NOTE ADULT - SUBJECTIVE AND OBJECTIVE BOX
HPI:  89 year old female PMH HTN, chronic A.fib has refused AC in the past, possible mild dementia (no official dx), who presents with complaint of AMS - this morning not speaking or following commands, lethargic, last known well at approx 9PM evening prior (~11 hours prior to arrival), reportedly generalized weakness/ decreased ambulation over 1 week and fatigue, does not ambulate much at baseline. Able to speak, converse at baseline. Today not speaking at all. No falls or trauma. Not on any blood thinners per family's knowledge. Recently treated for UTI. At baseline basically non-ambulatory, small distances with HHA at home.   (21 Jan 2024 12:58)    PERTINENT PM/SXH:       FAMILY HISTORY:    ITEMS NOT CHECKED ARE NOT PRESENT    SOCIAL HISTORY:   Significant other/partner:  [ ]  Children:  [ ]  Buddhist/Spirituality: Mu-ism  Substance hx:  [ ]   Tobacco hx:  [ ]   Alcohol hx: [ ]   Home Opioid hx:  [ ] I-Stop Reference No: Reference #: [id number]  Living Situation: [ ]Home  [ ]Long term care  [ ]Rehab [ ]Other    ADVANCE DIRECTIVES:    DNR  MOLST  [ ]  Living Will  [ ]   DECISION MAKER(s):  [ ] Health Care Proxy(s)  [x ] Surrogate(s)  [ ] Guardian           Name(s): Phone Number(s): Yuriy Covarrubias [phone]    BASELINE (I)ADL(s) (prior to admission):  [place]: [ ]Total  [ ] Moderate [ ]Dependent    Allergies    latex (Unknown)  penicillins (Unknown)    Intolerances    MEDICATIONS  (STANDING):  aspirin enteric coated 81 milliGRAM(s) Oral daily  atorvastatin 40 milliGRAM(s) Oral at bedtime  dextrose 5% + sodium chloride 0.45%. 1000 milliLiter(s) (60 mL/Hr) IV Continuous <Continuous>  heparin   Injectable 5000 Unit(s) SubCutaneous every 8 hours  metoprolol tartrate Injectable 5 milliGRAM(s) IV Push every 6 hours    MEDICATIONS  (PRN):    PRESENT SYMPTOMS: [ ]Unable to obtain due to poor mentation   Source if other than patient:  [ ]Family   [ ]Team     Pain: [ ] yes [ ] no  QOL impact -   Location -                    Aggravating factors -  Quality -  Radiation -  Timing-  Severity (0-10 scale):  Minimal acceptable level (0-10 scale):     PAIN AD Score:     [email or web address] (press ctrl +  left click to view)    Dyspnea:                           [ ]Mild [ ]Moderate [ ]Severe  Anxiety:                             [ ]Mild [ ]Moderate [ ]Severe  Fatigue:                             [ ]Mild [ ]Moderate [ ]Severe  Nausea:                             [ ]Mild [ ]Moderate [ ]Severe  Loss of appetite:              [ ]Mild [ ]Moderate [ ]Severe  Constipation:                    [ ]Mild [ ]Moderate [ ]Severe    Other Symptoms:  [ ]All other review of systems negative     Karnofsky Performance Score/Palliative Performance Status Version 2:       30-40  %    http://npcrc.org/files/news/palliative_performance_scale_ppsv2.pdf  PHYSICAL EXAM:  Vital Signs Last 24 Hrs  T(C): 36.8 (22 Jan 2024 05:35), Max: 37.1 (21 Jan 2024 23:58)  T(F): 98.2 (22 Jan 2024 05:35), Max: 98.7 (21 Jan 2024 23:58)  HR: 73 (22 Jan 2024 13:24) (70 - 90)  BP: 147/89 (22 Jan 2024 13:24) (142/79 - 155/90)  BP(mean): --  RR: 18 (22 Jan 2024 13:24) (15 - 21)  SpO2: 98% (22 Jan 2024 13:24) (97% - 100%)    Parameters below as of 22 Jan 2024 13:24  Patient On (Oxygen Delivery Method): room air     I&O's Summary  GENERAL:  [ ]Alert  [ ]Oriented x   [ x]Lethargic  [ ]Cachexia  [ ]Unarousable  [ ]Verbal  [ x]Non-Verbal  Behavioral:   [ ] Anxiety  [ ] Delirium [ ] Agitation [ ] Other  HEENT:  [ ]Normal   [x ]Dry mouth   [ ]ET Tube/Trach  [ ]Oral lesions  PULMONARY:   [ ]Clear [ ]Tachypnea  [ ]Audible excessive secretions   [ ]Rhonchi        [ ]Right [ ]Left [ ]Bilateral  [ ]Crackles        [ ]Right [ ]Left [ ]Bilateral  [ ]Wheezing     [ ]Right [ ]Left [ ]Bilateral  CARDIOVASCULAR:    [ ]Regular [x ]Irregular [ ]Tachy  [ ]Kristopher [ ]Murmur [ ]Other  GASTROINTESTINAL:  [x ]Soft  [ ]Distended   [ ]+BS  [ ]Non tender [ ]Tender  [ ]PEG [ ]OGT/ NGT  Last BM: 1/21/24 GENITOURINARY:  [ ]Normal [x ] Incontinent   [ ]Oliguria/Anuria   [ ]Cruz  MUSCULOSKELETAL:   [ ]Normal   [x ]Weakness  [ ]Bed/Wheelchair bound [ ]Edema  NEUROLOGIC:   [ ]No focal deficits  [x ] Cognitive impairment  [ ] Dysphagia [ ]Dysarthria [ ] Paresis [ ]Other   right sided facial droop  SKIN:   [ ]Normal   [ ]Pressure ulcer(s)  [ ]Rash    CRITICAL CARE:  [ ] Shock Present  [ ]Septic [ ]Cardiogenic [ ]Neurologic [ ]Hypovolemic  [ ]  Vasopressors [ ]  Inotropes   [ ] Respiratory failure present [ ] mechanical ventilation [ ] non-invasive ventilatory support [ ] High flow  [ ] Acute  [ ] Chronic [ ] Hypoxic  [ ] Hypercarbic [ ] Other  [ ] Other organ failure     LABS:                        16.3   10.27 )-----------( 158      ( 21 Jan 2024 09:05 )             49.2   01-21    141  |  105  |  21  ----------------------------<  88  4.7   |  30  |  0.75    Ca    9.0      21 Jan 2024 09:05    TPro  6.9  /  Alb  3.2<L>  /  TBili  0.9  /  DBili  x   /  AST  32  /  ALT  29  /  AlkPhos  71  01-21  PT/INR - ( 21 Jan 2024 09:05 )   PT: 12.0 sec;   INR: 1.00 ratio         PTT - ( 21 Jan 2024 09:05 )  PTT:29.0 sec    Urinalysis (01.21.24 @ 12:59)    Glucose Qualitative, Urine: Negative mg/dL   Blood, Urine: Negative   pH Urine: 7.5   Color: Yellow   Urine Appearance: Clear   Bilirubin: Negative   Ketone - Urine: Negative mg/dL   Specific Gravity: >1.030   Protein, Urine: Trace mg/dL   Urobilinogen: 1.0 mg/dL   Nitrite: Negative   Leukocyte Esterase Concentration: Negative    RADIOLOGY & ADDITIONAL STUDIES:  < from: CT Brain Perfusion Maps Stroke (01.21.24 @ 08:55) >  IMPRESSION:  Patient positioning is suboptimal. Streak artifact caused by contrast   bolus does degrade images of the left long and central neck including a   portion of the left common carotid artery as described above.  No acute intracranial hemorrhage or mass effect. Periventricular white   matter attenuation changes consistent with chronic microvascular ischemia   are asymmetric. Hypoattenuation is greater on the left with subtle   edematous changes involving portions of the left frontal lobe. Head   positioning is suboptimal but these are likely findings of early acute   left infarct. The intravascular arteries on noncontrast study are   hyperdense but this is bilateral.. These findings were discussed with Dr. Bradley at 8:56 AM and 9:03 AM on 1/21/2024.  Occlusion of the left middle cerebral artery M1 segment immediately after   the anterior temporal artery origin with some distal reconstitution as   described above. This finding was discussed with Dr. Bradley at 9:03 AM on   1/21/2024.  Abnormal perfusion showing left middle cerebral artery territory acute   infarct with surrounding penumbra as described above.  Large left thyroid lobe goiter as above. Right upper lobe pulmonary   nodule measures 0.5 cm and follow-up guidelines cannot be recommended on   incomplete imaging of the chest.  --- End of Report ---  < end of copied text >    PROTEIN CALORIE MALNUTRITION PRESENT: [ ] Yes [ ] No  [ ] PPSV2 < or = to 30% [ ] significant weight loss  [ ] poor nutritional intake [ ] catabolic state [ ] anasarca     Artificial Nutrition [ ]     REFERRALS:   [ ]Chaplaincy  [ ] Hospice  [ ]Child Life  [ ]Social Work  [ ]Case management [ ]Holistic Therapy     Goals of Care Document:     HPI:  89 year old female PMH HTN, chronic A.fib has refused AC in the past, possible mild dementia (no official dx), who presents with complaint of AMS - this morning not speaking or following commands, lethargic, last known well at approx 9PM evening prior (~11 hours prior to arrival), reportedly generalized weakness/ decreased ambulation over 1 week and fatigue, does not ambulate much at baseline. Able to speak, converse at baseline. Today not speaking at all. No falls or trauma. Not on any blood thinners per family's knowledge. Recently treated for UTI. At baseline basically non-ambulatory, small distances with HHA at home.   (21 Jan 2024 12:58)    PERTINENT PM/SXH:       FAMILY HISTORY:    ITEMS NOT CHECKED ARE NOT PRESENT    SOCIAL HISTORY:   Significant other/partner:  [ ]  Children:  [ ]  Religious/Spirituality: Christianity  Substance hx:  [ ]   Tobacco hx:  [ ]   Alcohol hx: [ ]   Home Opioid hx:  [ ] I-Stop Reference No: Reference #: [id number]  Living Situation: [ ]Home  [ ]Long term care  [ ]Rehab [ ]Other    ADVANCE DIRECTIVES:    DNR  MOLST  [ ]  Living Will  [ ]   DECISION MAKER(s):  [ ] Health Care Proxy(s)  [x ] Surrogate(s)  [ ] Guardian           Name(s): Phone Number(s): Yuriy Covarrubias [phone]    BASELINE (I)ADL(s) (prior to admission):  [place]: [ ]Total  [ ] Moderate [ ]Dependent    Allergies    latex (Unknown)  penicillins (Unknown)    Intolerances    MEDICATIONS  (STANDING):  aspirin enteric coated 81 milliGRAM(s) Oral daily  atorvastatin 40 milliGRAM(s) Oral at bedtime  dextrose 5% + sodium chloride 0.45%. 1000 milliLiter(s) (60 mL/Hr) IV Continuous <Continuous>  heparin   Injectable 5000 Unit(s) SubCutaneous every 8 hours  metoprolol tartrate Injectable 5 milliGRAM(s) IV Push every 6 hours    MEDICATIONS  (PRN):    PRESENT SYMPTOMS: [ x]Unable to obtain due to poor mentation   Source if other than patient:  [ ]Family   [ ]Team     Pain: [ ] yes [ ] no  QOL impact -   Location -                    Aggravating factors -  Quality -  Radiation -  Timing-  Severity (0-10 scale):  Minimal acceptable level (0-10 scale):     PAIN AD Score:     [email or web address] (press ctrl +  left click to view)    Dyspnea:                           [ ]Mild [ ]Moderate [ ]Severe  Anxiety:                             [ ]Mild [ ]Moderate [ ]Severe  Fatigue:                             [ ]Mild [ ]Moderate [ ]Severe  Nausea:                             [ ]Mild [ ]Moderate [ ]Severe  Loss of appetite:              [ ]Mild [ ]Moderate [ ]Severe  Constipation:                    [ ]Mild [ ]Moderate [ ]Severe    Other Symptoms:  [ ]All other review of systems negative     Karnofsky Performance Score/Palliative Performance Status Version 2:   30%    http://npcrc.org/files/news/palliative_performance_scale_ppsv2.pdf  PHYSICAL EXAM:  Vital Signs Last 24 Hrs  T(C): 36.8 (22 Jan 2024 05:35), Max: 37.1 (21 Jan 2024 23:58)  T(F): 98.2 (22 Jan 2024 05:35), Max: 98.7 (21 Jan 2024 23:58)  HR: 73 (22 Jan 2024 13:24) (70 - 90)  BP: 147/89 (22 Jan 2024 13:24) (142/79 - 155/90)  BP(mean): --  RR: 18 (22 Jan 2024 13:24) (15 - 21)  SpO2: 98% (22 Jan 2024 13:24) (97% - 100%)    Parameters below as of 22 Jan 2024 13:24  Patient On (Oxygen Delivery Method): room air     I&O's Summary  GENERAL:  [ ]Alert  [ ]Oriented x   [ x]Lethargic  [ ]Cachexia  [ ]Unarousable  [ ]Verbal  [ x]Non-Verbal  Behavioral:   [ ] Anxiety  [ ] Delirium [ ] Agitation [ ] Other  HEENT:  [ ]Normal   [x ]Dry mouth   [ ]ET Tube/Trach  [ ]Oral lesions  PULMONARY:   [ ]Clear [ ]Tachypnea  [ ]Audible excessive secretions   [ ]Rhonchi        [ ]Right [ ]Left [ ]Bilateral  [ ]Crackles        [ ]Right [ ]Left [ ]Bilateral  [ ]Wheezing     [ ]Right [ ]Left [ ]Bilateral  CARDIOVASCULAR:    [ ]Regular [x ]Irregular [ ]Tachy  [ ]Kristopher [ ]Murmur [ ]Other  GASTROINTESTINAL:  [x ]Soft  [ ]Distended   [ ]+BS  [ ]Non tender [ ]Tender  [ ]PEG [ ]OGT/ NGT  Last BM: 1/21/24 GENITOURINARY:  [ ]Normal [x ] Incontinent   [ ]Oliguria/Anuria   [ ]Cruz  MUSCULOSKELETAL:   [ ]Normal   [x ]Weakness  [ ]Bed/Wheelchair bound [ ]Edema  NEUROLOGIC:   [ ]No focal deficits  [x ] Cognitive impairment  [ ] Dysphagia [ ]Dysarthria [ ] Paresis [ ]Other   right sided facial droop  SKIN:   [ ]Normal   [ ]Pressure ulcer(s)  [ ]Rash    CRITICAL CARE:  [ ] Shock Present  [ ]Septic [ ]Cardiogenic [ ]Neurologic [ ]Hypovolemic  [ ]  Vasopressors [ ]  Inotropes   [ ] Respiratory failure present [ ] mechanical ventilation [ ] non-invasive ventilatory support [ ] High flow  [ ] Acute  [ ] Chronic [ ] Hypoxic  [ ] Hypercarbic [ ] Other  [ ] Other organ failure     LABS:                        16.3   10.27 )-----------( 158      ( 21 Jan 2024 09:05 )             49.2   01-21    141  |  105  |  21  ----------------------------<  88  4.7   |  30  |  0.75    Ca    9.0      21 Jan 2024 09:05    TPro  6.9  /  Alb  3.2<L>  /  TBili  0.9  /  DBili  x   /  AST  32  /  ALT  29  /  AlkPhos  71  01-21  PT/INR - ( 21 Jan 2024 09:05 )   PT: 12.0 sec;   INR: 1.00 ratio         PTT - ( 21 Jan 2024 09:05 )  PTT:29.0 sec    Urinalysis (01.21.24 @ 12:59)    Glucose Qualitative, Urine: Negative mg/dL   Blood, Urine: Negative   pH Urine: 7.5   Color: Yellow   Urine Appearance: Clear   Bilirubin: Negative   Ketone - Urine: Negative mg/dL   Specific Gravity: >1.030   Protein, Urine: Trace mg/dL   Urobilinogen: 1.0 mg/dL   Nitrite: Negative   Leukocyte Esterase Concentration: Negative    RADIOLOGY & ADDITIONAL STUDIES:  < from: CT Brain Perfusion Maps Stroke (01.21.24 @ 08:55) >  IMPRESSION:  Patient positioning is suboptimal. Streak artifact caused by contrast   bolus does degrade images of the left long and central neck including a   portion of the left common carotid artery as described above.  No acute intracranial hemorrhage or mass effect. Periventricular white   matter attenuation changes consistent with chronic microvascular ischemia   are asymmetric. Hypoattenuation is greater on the left with subtle   edematous changes involving portions of the left frontal lobe. Head   positioning is suboptimal but these are likely findings of early acute   left infarct. The intravascular arteries on noncontrast study are   hyperdense but this is bilateral.. These findings were discussed with Dr. Bradley at 8:56 AM and 9:03 AM on 1/21/2024.  Occlusion of the left middle cerebral artery M1 segment immediately after   the anterior temporal artery origin with some distal reconstitution as   described above. This finding was discussed with Dr. Bradley at 9:03 AM on   1/21/2024.  Abnormal perfusion showing left middle cerebral artery territory acute   infarct with surrounding penumbra as described above.  Large left thyroid lobe goiter as above. Right upper lobe pulmonary   nodule measures 0.5 cm and follow-up guidelines cannot be recommended on   incomplete imaging of the chest.  --- End of Report ---  < end of copied text >    PROTEIN CALORIE MALNUTRITION PRESENT: [ ] Yes [ ] No  [ ] PPSV2 < or = to 30% [ ] significant weight loss  [ ] poor nutritional intake [ ] catabolic state [ ] anasarca     Artificial Nutrition [ ]     REFERRALS:   [ ]Chaplaincy  [ ] Hospice  [ ]Child Life  [ ]Social Work  [ ]Case management [ ]Holistic Therapy     Goals of Care Document:

## 2024-01-22 NOTE — ED ADULT NURSE REASSESSMENT NOTE - NS ED NURSE REASSESS COMMENT FT1
Bladder scan done on pt. Pt has 568 ml in bladder. Bladder scan done on pt. Pt has 568 ml in bladder. Medicine PA paged, awaiting response. Pt remains connected to cardiac monitor, safety maintained. Assessment ongoing.

## 2024-01-22 NOTE — CONSULT NOTE ADULT - NS ATTEND AMEND GEN_ALL_CORE FT
89 year old female PMH of HTN, atrial fib (inconsistent AC use?), CVA presented to the ED for AMS.  Patient unable to follow commands, lethargic and unable to speak.  Patient with left MCA infarct. Palliative will follow for ongoing GOC discussion w family. Appears to be hospice eligible depending on goals.

## 2024-01-22 NOTE — CONSULT NOTE ADULT - PROBLEM SELECTOR RECOMMENDATION 4
See GOC above.  Patient with 4 children, is .  Offered family meeting to discuss GOC.  Son, Yuriy indicating he thinks patient would prefer to pass peacefully when the time comes.  Palliative to follow.    Message sent to Dr. Zhao

## 2024-01-22 NOTE — PROGRESS NOTE ADULT - ASSESSMENT
89 year old female PMH HTN, Sciatica, possible mild dementia (no official dx), who presents with complaint of AMS - this morning not speaking or following commands, lethargic, last known well at approx 9PM evening prior (~11 hours prior to arrival), reportedly generalized weakness/ decreased ambulation over 1 week and fatigue, does not ambulate much at baseline. Able to speak, converse at baseline. Today not speaking at all. No falls or trauma. Not on any blood thinners per family's knowledge. Recently treated for UTI.      Plan:  Admit to tele for AMS likely from new CVA. CT head notes new left MCA infract, no bleed, will likely have aphasia. Swallow eval requested, does not appear she will pass. IVF for now. Will not order brain MRI, not likely to . Change home Verapamil to IVP Lopressor, IVF while NPO until Swallow eval complete. Covid 19 positive, no treatment needed.     Added ASA and statin. Neuro was called.     Son agrees to full DNR/DNI, agree to Palliative Care eval and home hospice later this week.  Will sign MOLST form in AM.    89 year old female PMH HTN, Sciatica, possible mild dementia (no official dx), who presents with complaint of AMS - this morning not speaking or following commands, lethargic, last known well at approx 9PM evening prior (~11 hours prior to arrival), reportedly generalized weakness/ decreased ambulation over 1 week and fatigue, does not ambulate much at baseline. Able to speak, converse at baseline. Today not speaking at all. No falls or trauma. Not on any blood thinners per family's knowledge. Recently treated for UTI.      Plan:  Admit to tele for AMS likely from new CVA. CT head notes new left MCA infract, no bleed, will likely have aphasia. Swallow eval requested, does not appear she will pass. IVF for now. Will not order brain MRI, not likely to . Change home Verapamil to IVP Lopressor, IVF while NPO until Swallow eval complete.  Spoke to Neuro attending last  evening regarding case.     Covid 19 positive, no treatment needed.     Added ASA and statin. Neuro was called.     Son agrees to full DNR/DNI, agree to Palliative Care eval and home hospice later this week.  Will sign MOLST form in AM.

## 2024-01-22 NOTE — PROGRESS NOTE ADULT - SUBJECTIVE AND OBJECTIVE BOX
INTERVAL HPI/OVERNIGHT EVENTS:  Pt seen and examined at bedside.     Allergies/Intolerance: latex (Unknown)  penicillins (Unknown)      MEDICATIONS  (STANDING):  aspirin enteric coated 81 milliGRAM(s) Oral daily  atorvastatin 40 milliGRAM(s) Oral at bedtime  dextrose 5% + sodium chloride 0.45%. 1000 milliLiter(s) (60 mL/Hr) IV Continuous <Continuous>  heparin   Injectable 5000 Unit(s) SubCutaneous every 8 hours  metoprolol tartrate Injectable 5 milliGRAM(s) IV Push every 6 hours    MEDICATIONS  (PRN):        ROS: all systems reviewed and wnl      PHYSICAL EXAMINATION:  Vital Signs Last 24 Hrs  T(C): 36.8 (22 Jan 2024 05:35), Max: 37.1 (21 Jan 2024 23:58)  T(F): 98.2 (22 Jan 2024 05:35), Max: 98.7 (21 Jan 2024 23:58)  HR: 85 (22 Jan 2024 05:35) (70 - 90)  BP: 153/98 (22 Jan 2024 05:35) (142/79 - 163/108)  BP(mean): --  RR: 18 (22 Jan 2024 05:35) (15 - 18)  SpO2: 100% (22 Jan 2024 05:35) (97% - 100%)    Parameters below as of 22 Jan 2024 00:32  Patient On (Oxygen Delivery Method): room air      CAPILLARY BLOOD GLUCOSE      POCT Blood Glucose.: 86 mg/dL (21 Jan 2024 15:25)        GENERAL:   NECK: supple, No JVD  CHEST/LUNG: clear to auscultation bilaterally; no rales, rhonchi, or wheezing b/l  HEART: normal S1, S2  ABDOMEN: BS+, soft, ND, NT   EXTREMITIES:  pulses palpable; no clubbing, cyanosis, or edema b/l LEs  SKIN: no rashes or lesions      LABS:                        16.3   10.27 )-----------( 158      ( 21 Jan 2024 09:05 )             49.2     01-21    141  |  105  |  21  ----------------------------<  88  4.7   |  30  |  0.75    Ca    9.0      21 Jan 2024 09:05    TPro  6.9  /  Alb  3.2<L>  /  TBili  0.9  /  DBili  x   /  AST  32  /  ALT  29  /  AlkPhos  71  01-21    PT/INR - ( 21 Jan 2024 09:05 )   PT: 12.0 sec;   INR: 1.00 ratio         PTT - ( 21 Jan 2024 09:05 )  PTT:29.0 sec  Urinalysis Basic - ( 21 Jan 2024 12:59 )    Color: Yellow / Appearance: Clear / SG: >1.030 / pH: x  Gluc: x / Ketone: Negative mg/dL  / Bili: Negative / Urobili: 1.0 mg/dL   Blood: x / Protein: Trace mg/dL / Nitrite: Negative   Leuk Esterase: Negative / RBC: x / WBC x   Sq Epi: x / Non Sq Epi: x / Bacteria: x             INTERVAL HPI/OVERNIGHT EVENTS:  Pt seen and examined at bedside.     Allergies/Intolerance: latex (Unknown)  penicillins (Unknown)      MEDICATIONS  (STANDING):  aspirin enteric coated 81 milliGRAM(s) Oral daily  atorvastatin 40 milliGRAM(s) Oral at bedtime  dextrose 5% + sodium chloride 0.45%. 1000 milliLiter(s) (60 mL/Hr) IV Continuous <Continuous>  heparin   Injectable 5000 Unit(s) SubCutaneous every 8 hours  metoprolol tartrate Injectable 5 milliGRAM(s) IV Push every 6 hours    MEDICATIONS  (PRN):        ROS: all systems reviewed and wnl      PHYSICAL EXAMINATION:  Vital Signs Last 24 Hrs  T(C): 36.8 (22 Jan 2024 05:35), Max: 37.1 (21 Jan 2024 23:58)  T(F): 98.2 (22 Jan 2024 05:35), Max: 98.7 (21 Jan 2024 23:58)  HR: 85 (22 Jan 2024 05:35) (70 - 90)  BP: 153/98 (22 Jan 2024 05:35) (142/79 - 163/108)  BP(mean): --  RR: 18 (22 Jan 2024 05:35) (15 - 18)  SpO2: 100% (22 Jan 2024 05:35) (97% - 100%)    Parameters below as of 22 Jan 2024 00:32  Patient On (Oxygen Delivery Method): room air      CAPILLARY BLOOD GLUCOSE      POCT Blood Glucose.: 86 mg/dL (21 Jan 2024 15:25)        GENERAL: stable, in bed, looks to left, non-verbal.   NECK: supple, No JVD  CHEST/LUNG: clear to auscultation bilaterally; no rales, rhonchi, or wheezing b/l  HEART: normal S1, S2  ABDOMEN: BS+, soft, ND, NT   EXTREMITIES:  pulses palpable; no clubbing, cyanosis, or edema b/l LEs    LABS:                        16.3   10.27 )-----------( 158      ( 21 Jan 2024 09:05 )             49.2     01-21    141  |  105  |  21  ----------------------------<  88  4.7   |  30  |  0.75    Ca    9.0      21 Jan 2024 09:05    TPro  6.9  /  Alb  3.2<L>  /  TBili  0.9  /  DBili  x   /  AST  32  /  ALT  29  /  AlkPhos  71  01-21    PT/INR - ( 21 Jan 2024 09:05 )   PT: 12.0 sec;   INR: 1.00 ratio         PTT - ( 21 Jan 2024 09:05 )  PTT:29.0 sec  Urinalysis Basic - ( 21 Jan 2024 12:59 )    Color: Yellow / Appearance: Clear / SG: >1.030 / pH: x  Gluc: x / Ketone: Negative mg/dL  / Bili: Negative / Urobili: 1.0 mg/dL   Blood: x / Protein: Trace mg/dL / Nitrite: Negative   Leuk Esterase: Negative / RBC: x / WBC x   Sq Epi: x / Non Sq Epi: x / Bacteria: x

## 2024-01-22 NOTE — CONSULT NOTE ADULT - CONVERSATION DETAILS
Spoke with patient's son, Yuriy [phone] via phone as he was not available to speak in person.  Patient was living at home with 12 hours HHA 7p-7a and he niece stayed with her during the daytime so someone is always around.  Patient is wheelchair bound at baseline, able to pivot to chair, but over the last week has had more leg pain and not been out of bed x 2 days prior to admission.  He said patient has "not been doing good" over the past month, "loosing it a bit mentally and showing signs of dementia" and having pain.  He had spoken with neuro and said he is aware there is limited amount that can be done for patient in setting of acute CVA.  Tamy has 3 other siblings and said his sister is flying in, they are all aware of patient's situation.  Asked about wishes regarding LST such as CPR and he said he thinks patient would prefer to go peacefully.  Offered family meeting to discuss in greater depth.  Palliative to follow. Spoke with patient's son, Yuriy [phone] via phone as he was not available to speak in person.  Patient was living at home with 12 hours HHA 7p-7a and he niece stayed with her during the daytime so someone is always around.  Patient is wheelchair bound at baseline, able to pivot to chair, but over the last week has had more leg pain and not been out of bed x 2 days prior to admission.  He said patient has "not been doing good" over the past month, "loosing it a bit mentally and showing signs of dementia" and having pain.  He had spoken with neuro and said he is aware there is limited amount that can be done for patient in setting of acute CVA.  Yuriy has 3 other siblings and said his sister is flying in, they are all aware of patient's situation.  Asked about wishes regarding LST such as CPR and he said he thinks patient would prefer to go peacefully.  Offered family meeting to discuss in greater depth.  Palliative to follow.

## 2024-01-23 PROCEDURE — 99232 SBSQ HOSP IP/OBS MODERATE 35: CPT | Mod: FS

## 2024-01-23 PROCEDURE — 99232 SBSQ HOSP IP/OBS MODERATE 35: CPT

## 2024-01-23 RX ORDER — MORPHINE SULFATE 50 MG/1
2.5 CAPSULE, EXTENDED RELEASE ORAL EVERY 4 HOURS
Refills: 0 | Status: DISCONTINUED | OUTPATIENT
Start: 2024-01-23 | End: 2024-01-24

## 2024-01-23 RX ADMIN — Medication 5 MILLIGRAM(S): at 06:52

## 2024-01-23 RX ADMIN — Medication 5 MILLIGRAM(S): at 23:59

## 2024-01-23 RX ADMIN — HEPARIN SODIUM 5000 UNIT(S): 5000 INJECTION INTRAVENOUS; SUBCUTANEOUS at 06:52

## 2024-01-23 RX ADMIN — SODIUM CHLORIDE 60 MILLILITER(S): 9 INJECTION, SOLUTION INTRAVENOUS at 15:48

## 2024-01-23 RX ADMIN — Medication 5 MILLIGRAM(S): at 11:40

## 2024-01-23 RX ADMIN — HEPARIN SODIUM 5000 UNIT(S): 5000 INJECTION INTRAVENOUS; SUBCUTANEOUS at 22:24

## 2024-01-23 RX ADMIN — HEPARIN SODIUM 5000 UNIT(S): 5000 INJECTION INTRAVENOUS; SUBCUTANEOUS at 15:46

## 2024-01-23 RX ADMIN — Medication 5 MILLIGRAM(S): at 18:09

## 2024-01-23 NOTE — PROGRESS NOTE ADULT - CONVERSATION DETAILS
Spoke with patient's son's Ed and Kevin in person and Danyelle (patient's daughter) via phone.  They are interested in hospice and educated on hospice's philosophy of care as well as where it can occur.  Patient has overnight HHA, but needs help during the day and on weekends and likely would need placement in a facility.  Explained patient not a candidate for inpatient hospice at this time.  Discussed patient would likely need to go to a facility with comfort measures if home is not possible.  Danyelle is expected to arrive from [place] tomorrow.   Discussed patient not likely to eat much if anything at all and pleasure feeds can be provided if she is awake enough to eat or asking to eat, but explained that does not seem likely.  They wish to continue IVF for now, stop further labs and allow for a natural death.   Confirm DNR/I/No NIV, no feeding tube and DNH unless severe symptoms/comfort measures/and determine the use of IVF and abx.  Discussed use of low-dose morphine PRN for pain/dyspnea, family in agreement.

## 2024-01-23 NOTE — PROGRESS NOTE ADULT - PROBLEM SELECTOR PLAN 4
See GOC above.  Patient's family considering NH with comfort measures, awaiting arrival of her daughter, Danyelle from [place].  Continue with IVF for now, no further labs, comfort measures.  MOLST drafted indicating DNR/I/No NIV/DNH unless severe symptoms/no feeding tube/determine the use of IVF and abx.  Morphine solution PRN for pain/dyspnea.     Discussed with Dr. Zhao and Lili GARCIA

## 2024-01-23 NOTE — PROGRESS NOTE ADULT - SUBJECTIVE AND OBJECTIVE BOX
SUBJECTIVE AND OBJECTIVE: Patient obtunded, laying in bed, sons Kevin and Ed at bedside  INTERVAL HPI/OVERNIGHT EVENTS:    DNR on chart: yes  Allergies    Celebrex (Rash)  latex (Unknown)  penicillins (Unknown)    Intolerances    MEDICATIONS  (STANDING):  dextrose 5% + sodium chloride 0.45%. 1000 milliLiter(s) (60 mL/Hr) IV Continuous <Continuous>  heparin   Injectable 5000 Unit(s) SubCutaneous every 8 hours  metoprolol tartrate Injectable 5 milliGRAM(s) IV Push every 6 hours    MEDICATIONS  (PRN):  bisacodyl Suppository 10 milliGRAM(s) Rectal daily PRN Constipation  morphine   Solution 2.5 milliGRAM(s) Oral every 4 hours PRN dyspnea or severe pain      ITEMS UNCHECKED ARE NOT PRESENT    PRESENT SYMPTOMS: [x ]Unable to obtain due to poor mentation   Source if other than patient:  [ ]Family   [ ]Team     Pain:  [ ]yes [ ]no  QOL impact -   Location -                    Aggravating factors -  Quality -  Radiation -  Timing-  Severity (0-10 scale):  Minimal acceptable level (0-10 scale):     Dyspnea:                           [ ]Mild [ ]Moderate [ ]Severe  Anxiety:                             [ ]Mild [ ]Moderate [ ]Severe  Fatigue:                             [ ]Mild [ ]Moderate [ ]Severe  Nausea:                             [ ]Mild [ ]Moderate [ ]Severe  Loss of appetite:              [ ]Mild [ ]Moderate [ ]Severe  Constipation:                    [ ]Mild [ ]Moderate [ ]Severe    PAIN AD Score:	0  [email or web address]/[email or web address] (Ctrl + left click to view)    Other Symptoms:  [ ]All other review of systems negative     Palliative Performance Status Version 2:       20  %      http://npcrc.org/files/news/palliative_performance_scale_ppsv2.pdf  PHYSICAL EXAM:  Vital Signs Last 24 Hrs  T(C): 36.6 (23 Jan 2024 13:09), Max: 36.8 (22 Jan 2024 23:34)  T(F): 97.8 (23 Jan 2024 13:09), Max: 98.3 (22 Jan 2024 23:34)  HR: 89 (23 Jan 2024 13:09) (73 - 93)  BP: 139/78 (23 Jan 2024 13:09) (135/81 - 166/96)  BP(mean): --  RR: 18 (23 Jan 2024 13:09) (16 - 20)  SpO2: 97% (23 Jan 2024 13:09) (96% - 99%)    Parameters below as of 23 Jan 2024 13:09  Patient On (Oxygen Delivery Method): room air     I&O's Summary     GENERAL:  [ ]Alert  [ ]Oriented x   [ ]Lethargic  [ ]Cachexia  [x ]Unarousable  [ ]Verbal  [x ]Non-Verbal  Behavioral:   [ ]Anxiety  [ ]Delirium [ ]Agitation [ ]Other  HEENT:  [x ]Normal   [ ]Dry mouth   [ ]ET Tube/Trach  [ ]Oral lesions  PULMONARY:   [ ]Clear [ ]Tachypnea  [ ]Audible excessive secretions   [ ]Rhonchi        [ ]Right [ ]Left [ ]Bilateral  [ ]Crackles        [ ]Right [ ]Left [ ]Bilateral  [ ]Wheezing     [ ]Right [ ]Left [ ]Bilateral  [ ]Diminished BS [ ] Right [ ]Left [ ]Bilateral  CARDIOVASCULAR:    [ ]Regular [x ]Irregular [ ]Tachy  [ ]Kristopher [ ]Murmur [ ]Other  GASTROINTESTINAL:  [ x]Soft  [ ]Distended   [ ]+BS  [ ]Non tender [ ]Tender  [ ]PEG [ ]OGT/ NGT   Last BM:  1/21/24  GENITOURINARY:  [ ]Normal [ ]Incontinent   [ ]Oliguria/Anuria   [ x]Cruz  MUSCULOSKELETAL:   [ ]Normal   [ ]Weakness  [ x]Bed/Wheelchair bound [ ]Edema  NEUROLOGIC:   [ ]No focal deficits  [x ] Cognitive impairment  [ ] Dysphagia [ ]Dysarthria [ ] Paresis [ ]Other   SKIN:   [ ]Normal  [ ]Rash   [ ]Pressure ulcer(s) [ ]y [ ]n present on admission    CRITICAL CARE:  [ ]Shock Present  [ ]Septic [ ]Cardiogenic [ ]Neurologic [ ]Hypovolemic  [ ]Vasopressors [ ]Inotropes  [ ]Respiratory failure present [ ]Mechanical Ventilation [ ]Non-invasive ventilatory support [ ]High-Flow  [ ]Acute  [ ]Chronic [ ]Hypoxic  [ ]Hypercarbic [ ]Other  [ ]Other organ failure     LABS: none new    RADIOLOGY & ADDITIONAL STUDIES: none new    Protein Calorie Malnutrition Present: [ ]mild [ ]moderate [ ]severe [ ]underweight [ ]morbid obesity  https://www.andeal.org/vault/4841/web/files/ONC/Table_Clinical%20Characteristics%20to%20Document%20Malnutrition-White%20JV%20et%20al%202012.pdf    Height (cm): 167.6 (01-21-24 @ 08:32)  Weight (kg): 61.5 (01-23-24 @ 00:12)  BMI (kg/m2): 21.9 (01-23-24 @ 00:12)    [ ]PPSV2 < or = 30%  [ ]significant weight loss [ ]poor nutritional intake [ ]anasarca    [ ]Artificial Nutrition    REFERRALS:   [ ]Chaplaincy  [ ]Hospice  [ ]Child Life  [ ]Social Work  [ ]Case management [ ]Holistic Therapy     Goals of Care Document:

## 2024-01-23 NOTE — PROGRESS NOTE ADULT - SUBJECTIVE AND OBJECTIVE BOX
INTERVAL HPI/OVERNIGHT EVENTS:  Pt seen and examined at bedside.     Allergies/Intolerance: latex (Unknown)  penicillins (Unknown)      MEDICATIONS  (STANDING):  dextrose 5% + sodium chloride 0.45%. 1000 milliLiter(s) (60 mL/Hr) IV Continuous <Continuous>  heparin   Injectable 5000 Unit(s) SubCutaneous every 8 hours  metoprolol tartrate Injectable 5 milliGRAM(s) IV Push every 6 hours    MEDICATIONS  (PRN):        ROS: all systems reviewed and wnl      PHYSICAL EXAMINATION:  Vital Signs Last 24 Hrs  T(C): 36.6 (23 Jan 2024 05:14), Max: 36.8 (22 Jan 2024 23:34)  T(F): 97.8 (23 Jan 2024 05:14), Max: 98.3 (22 Jan 2024 23:34)  HR: 86 (23 Jan 2024 05:14) (73 - 89)  BP: 166/96 (23 Jan 2024 05:14) (135/81 - 166/96)  BP(mean): --  RR: 16 (23 Jan 2024 05:14) (16 - 21)  SpO2: 96% (23 Jan 2024 05:14) (96% - 99%)    Parameters below as of 23 Jan 2024 05:14  Patient On (Oxygen Delivery Method): room air      CAPILLARY BLOOD GLUCOSE            GENERAL: stable, still NPO, non-verbal from CVA  NECK: supple, No JVD  CHEST/LUNG: clear to auscultation bilaterally; no rales, rhonchi, or wheezing b/l  HEART: normal S1, S2  ABDOMEN: BS+, soft, ND, NT   EXTREMITIES:  pulses palpable; no clubbing, cyanosis, or edema b/l LEs    LABS:                        16.3   10.27 )-----------( 158      ( 21 Jan 2024 09:05 )             49.2     01-21    141  |  105  |  21  ----------------------------<  88  4.7   |  30  |  0.75    Ca    9.0      21 Jan 2024 09:05    TPro  6.9  /  Alb  3.2<L>  /  TBili  0.9  /  DBili  x   /  AST  32  /  ALT  29  /  AlkPhos  71  01-21    PT/INR - ( 21 Jan 2024 09:05 )   PT: 12.0 sec;   INR: 1.00 ratio         PTT - ( 21 Jan 2024 09:05 )  PTT:29.0 sec  Urinalysis Basic - ( 21 Jan 2024 12:59 )    Color: Yellow / Appearance: Clear / SG: >1.030 / pH: x  Gluc: x / Ketone: Negative mg/dL  / Bili: Negative / Urobili: 1.0 mg/dL   Blood: x / Protein: Trace mg/dL / Nitrite: Negative   Leuk Esterase: Negative / RBC: x / WBC x   Sq Epi: x / Non Sq Epi: x / Bacteria: x

## 2024-01-23 NOTE — PATIENT PROFILE ADULT - FALL HARM RISK - HARM RISK INTERVENTIONS
Assistance with ambulation/Assistance OOB with selected safe patient handling equipment/Communicate Risk of Fall with Harm to all staff/Discuss with provider need for PT consult/Monitor gait and stability/Reinforce activity limits and safety measures with patient and family/Tailored Fall Risk Interventions/Visual Cue: Yellow wristband and red socks/Bed in lowest position, wheels locked, appropriate side rails in place/Call bell, personal items and telephone in reach/Instruct patient to call for assistance before getting out of bed or chair/Non-slip footwear when patient is out of bed/[place] to call system/Physically safe environment - no spills, clutter or unnecessary equipment/Purposeful Proactive Rounding/Room/bathroom lighting operational, light cord in reach

## 2024-01-23 NOTE — PATIENT PROFILE ADULT - FUNCTIONAL ASSESSMENT - BASIC MOBILITY 6.
1-calculated by average/Not able to assess (calculate score using [facility] averaging method)

## 2024-01-23 NOTE — PROGRESS NOTE ADULT - ASSESSMENT
89 year old female PMH HTN, Sciatica, possible mild dementia (no official dx), who presents with complaint of AMS - this morning not speaking or following commands, lethargic, last known well at approx 9PM evening prior (~11 hours prior to arrival), reportedly generalized weakness/ decreased ambulation over 1 week and fatigue, does not ambulate much at baseline. Able to speak, converse at baseline. Today not speaking at all. No falls or trauma. Not on any blood thinners per family's knowledge. Recently treated for UTI.      Plan:  Admit to tele for AMS likely from new CVA. CT head notes new left MCA infract, no bleed, will likely have aphasia. Swallow eval requested, does not appear she will pass. IVF for now. Will not order brain MRI, not likely to . Change home Verapamil to IVP Lopressor, IVF while NPO until Swallow eval complete.  Spoke to Neuro attending last  evening regarding case.     Covid 19 positive, no treatment needed.     Added ASA and statin. Neuro was called.     Son agrees to full DNR/DNI, agree to Palliative Care eval and home hospice later this week.  Will sign MOLST form in AM.    89 year old female PMH HTN, Sciatica, possible mild dementia (no official dx), who presents with complaint of AMS - this morning not speaking or following commands, lethargic, last known well at approx 9PM evening prior (~11 hours prior to arrival), reportedly generalized weakness/ decreased ambulation over 1 week and fatigue, does not ambulate much at baseline. Able to speak, converse at baseline. Today not speaking at all. No falls or trauma. Not on any blood thinners per family's knowledge. Recently treated for UTI.      Plan:  Admit to tele for AMS likely from new CVA. CT head notes new left MCA infract, no bleed, will likely have aphasia. Swallow eval requested, does not appear she will pass. IVF for now. Will not order brain MRI, not likely to . Change home Verapamil to IVP Lopressor,     IVF while NPO, failed Speech and Swallow eval.     Spoke to Neuro attending last  evening regarding case.     Covid 19 positive, no treatment needed.     Added ASA and statin if tolerates PO diet.     Son agrees to full DNR/DNI, agree to Palliative Care eval and home hospice later this week.  Will sign MOLST form in AM.       Likely needs inpatient hospice, await Palliative Care meeting today at 12 noon.

## 2024-01-24 ENCOUNTER — TRANSCRIPTION ENCOUNTER (OUTPATIENT)
Age: 89
End: 2024-01-24

## 2024-01-24 VITALS
TEMPERATURE: 99 F | DIASTOLIC BLOOD PRESSURE: 84 MMHG | SYSTOLIC BLOOD PRESSURE: 128 MMHG | OXYGEN SATURATION: 93 % | HEART RATE: 86 BPM | RESPIRATION RATE: 18 BRPM

## 2024-01-24 PROCEDURE — 99239 HOSP IP/OBS DSCHRG MGMT >30: CPT

## 2024-01-24 RX ORDER — MORPHINE SULFATE 50 MG/1
1.25 CAPSULE, EXTENDED RELEASE ORAL
Qty: 0 | Refills: 0 | DISCHARGE
Start: 2024-01-24

## 2024-01-24 RX ADMIN — Medication 5 MILLIGRAM(S): at 12:30

## 2024-01-24 RX ADMIN — Medication 5 MILLIGRAM(S): at 05:50

## 2024-01-24 RX ADMIN — HEPARIN SODIUM 5000 UNIT(S): 5000 INJECTION INTRAVENOUS; SUBCUTANEOUS at 05:50

## 2024-01-24 NOTE — DISCHARGE NOTE PROVIDER - NSDCCPCAREPLAN_GEN_ALL_CORE_FT
PRINCIPAL DISCHARGE DIAGNOSIS  Diagnosis: Acute cerebrovascular accident (CVA)  Assessment and Plan of Treatment: You are being discharged with a goal of care focused on comfort and it is recommended to focus on comfort measures and supportive care. Continue with medications prescribed for pain and other symptom control.         SECONDARY DISCHARGE DIAGNOSES  Diagnosis: Encounter for palliative care  Assessment and Plan of Treatment:

## 2024-01-24 NOTE — PROGRESS NOTE ADULT - SUBJECTIVE AND OBJECTIVE BOX
INTERVAL HPI/OVERNIGHT EVENTS:  Pt seen and examined at bedside.     Allergies/Intolerance: Celebrex (Rash)  latex (Unknown)  penicillins (Unknown)      MEDICATIONS  (STANDING):  dextrose 5% + sodium chloride 0.45%. 1000 milliLiter(s) (60 mL/Hr) IV Continuous <Continuous>  heparin   Injectable 5000 Unit(s) SubCutaneous every 8 hours  metoprolol tartrate Injectable 5 milliGRAM(s) IV Push every 6 hours    MEDICATIONS  (PRN):  bisacodyl Suppository 10 milliGRAM(s) Rectal daily PRN Constipation  morphine   Solution 2.5 milliGRAM(s) Oral every 4 hours PRN dyspnea or severe pain        ROS: all systems reviewed and wnl      PHYSICAL EXAMINATION:  Vital Signs Last 24 Hrs  T(C): 36.4 (24 Jan 2024 05:06), Max: 36.6 (23 Jan 2024 10:59)  T(F): 97.5 (24 Jan 2024 05:06), Max: 97.9 (23 Jan 2024 10:59)  HR: 88 (24 Jan 2024 05:06) (69 - 95)  BP: 149/93 (24 Jan 2024 05:06) (139/78 - 164/97)  BP(mean): --  RR: 18 (24 Jan 2024 05:06) (17 - 18)  SpO2: 95% (24 Jan 2024 05:06) (92% - 97%)    Parameters below as of 24 Jan 2024 05:06  Patient On (Oxygen Delivery Method): room air      CAPILLARY BLOOD GLUCOSE          01-23 @ 07:01  -  01-24 @ 07:00  --------------------------------------------------------  IN: 0 mL / OUT: 900 mL / NET: -900 mL        GENERAL: stable, non-verbal, still NPO, no fevers, right HP no change.   NECK: supple, No JVD  CHEST/LUNG: clear to auscultation bilaterally; no rales, rhonchi, or wheezing b/l  HEART: normal S1, S2  ABDOMEN: BS+, soft, ND, NT   EXTREMITIES:  pulses palpable; no clubbing, cyanosis, or edema b/l LEs    LABS:

## 2024-01-24 NOTE — DISCHARGE NOTE PROVIDER - HOSPITAL COURSE
89 year old female PMH HTN, Sciatica, possible mild dementia (no official dx), who presents with complaint of AMS - this morning not speaking or following commands, lethargic, last known well at approx 9PM evening prior (~11 hours prior to arrival), reportedly generalized weakness/ decreased ambulation over 1 week and fatigue, does not ambulate much at baseline. Able to speak, converse at baseline. Today not speaking at all. No falls or trauma. Not on any blood thinners per family's knowledge. Recently treated for UTI.      Plan:  Admit to tele for AMS likely from new CVA. CT head notes new left MCA infract, no bleed, will likely have aphasia. Swallow eval requested, does not appear she will pass. IVF for now.     Will not order brain MRI, not likely to . Change home Verapamil to IVP Lopressor,     IVF while NPO, failed Speech and Swallow eval.     Spoke to Neuro attending last  evening regarding case.     Covid 19 positive, no treatment needed.     Added ASA and statin if tolerates PO diet.     Son agrees to full DNR/DNI, agree to Palliative Care eval and home hospice later this week.  Will sign MOLST form in AM.       Likely needs inpatient hospice, await Palliative Care meeting today at 12 noon.     Stable for inpatient hospice.

## 2024-01-24 NOTE — DISCHARGE NOTE PROVIDER - NSDCMRMEDTOKEN_GEN_ALL_CORE_FT
bisacodyl 10 mg rectal suppository: 1 suppository(ies) rectal once a day As needed Constipation  morphine 10 mg/5 mL oral solution: 1.25 milliliter(s) orally every 4 hours As needed dyspnea or severe pain

## 2024-01-24 NOTE — PROGRESS NOTE ADULT - ASSESSMENT
89 year old female PMH HTN, Sciatica, possible mild dementia (no official dx), who presents with complaint of AMS - this morning not speaking or following commands, lethargic, last known well at approx 9PM evening prior (~11 hours prior to arrival), reportedly generalized weakness/ decreased ambulation over 1 week and fatigue, does not ambulate much at baseline. Able to speak, converse at baseline. Today not speaking at all. No falls or trauma. Not on any blood thinners per family's knowledge. Recently treated for UTI.      Plan:  Admit to tele for AMS likely from new CVA. CT head notes new left MCA infract, no bleed, will likely have aphasia. Swallow eval requested, does not appear she will pass. IVF for now. Will not order brain MRI, not likely to . Change home Verapamil to IVP Lopressor,     IVF while NPO, failed Speech and Swallow eval.     Spoke to Neuro attending last  evening regarding case.     Covid 19 positive, no treatment needed.     Added ASA and statin if tolerates PO diet.     Son agrees to full DNR/DNI, agree to Palliative Care eval and home hospice later this week.  Will sign MOLST form in AM.       Likely needs inpatient hospice, await Palliative Care meeting today at 12 noon.  89 year old female PMH HTN, Sciatica, possible mild dementia (no official dx), who presents with complaint of AMS - this morning not speaking or following commands, lethargic, last known well at approx 9PM evening prior (~11 hours prior to arrival), reportedly generalized weakness/ decreased ambulation over 1 week and fatigue, does not ambulate much at baseline. Able to speak, converse at baseline. Today not speaking at all. No falls or trauma. Not on any blood thinners per family's knowledge. Recently treated for UTI.      Plan:  Admit to tele for AMS likely from new CVA. CT head notes new left MCA infract, no bleed, will likely have aphasia. Swallow eval requested, does not appear she will pass. IVF for now.     Will not order brain MRI, not likely to . Change home Verapamil to IVP Lopressor,     IVF while NPO, failed Speech and Swallow eval.     Spoke to Neuro attending last  evening regarding case.     Covid 19 positive, no treatment needed.     Added ASA and statin if tolerates PO diet.     Son agrees to full DNR/DNI, agree to Palliative Care eval and home hospice later this week.  Will sign MOLST form in AM.       Likely needs inpatient hospice, await Palliative Care meeting today at 12 noon.     Stable for inpatient hospice.

## 2024-01-24 NOTE — DISCHARGE NOTE NURSING/CASE MANAGEMENT/SOCIAL WORK - PATIENT PORTAL LINK FT
You can access the FollowMyHealth Patient Portal offered by [facility] by registering at the following website: [email or web address]. By joining [employer]’s FollowMyHealth portal, you will also be able to view your health information using other applications (apps) compatible with our system.

## 2024-01-24 NOTE — DISCHARGE NOTE NURSING/CASE MANAGEMENT/SOCIAL WORK - NSDCPEFALRISK_GEN_ALL_CORE
For information on Fall & Injury Prevention, visit: [email or web address] OR  [email or web address] OR  [email or web address]://www.cdc.gov/steadi/patient.html

## 2024-02-01 DIAGNOSIS — R53.81 OTHER MALAISE: ICD-10-CM

## 2024-02-01 DIAGNOSIS — U07.1 COVID-19: ICD-10-CM

## 2024-02-01 DIAGNOSIS — R47.01 APHASIA: ICD-10-CM

## 2024-02-01 DIAGNOSIS — I48.20 CHRONIC ATRIAL FIBRILLATION, UNSPECIFIED: ICD-10-CM

## 2024-02-01 DIAGNOSIS — Z66 DO NOT RESUSCITATE: ICD-10-CM

## 2024-02-01 DIAGNOSIS — I10 ESSENTIAL (PRIMARY) HYPERTENSION: ICD-10-CM

## 2024-02-01 DIAGNOSIS — R29.722 NIHSS SCORE 22: ICD-10-CM

## 2024-02-01 DIAGNOSIS — Z91.040 LATEX ALLERGY STATUS: ICD-10-CM

## 2024-02-01 DIAGNOSIS — Z88.0 ALLERGY STATUS TO PENICILLIN: ICD-10-CM

## 2024-02-01 DIAGNOSIS — I63.9 CEREBRAL INFARCTION, UNSPECIFIED: ICD-10-CM

## 2024-02-01 DIAGNOSIS — F03.A0 UNSPECIFIED DEMENTIA, MILD, WITHOUT BEHAVIORAL DISTURBANCE, PSYCHOTIC DISTURBANCE, MOOD DISTURBANCE, AND ANXIETY: ICD-10-CM

## 2024-02-01 DIAGNOSIS — Z51.5 ENCOUNTER FOR PALLIATIVE CARE: ICD-10-CM

## 2024-02-01 DIAGNOSIS — I63.512 CEREBRAL INFARCTION DUE TO UNSPECIFIED OCCLUSION OR STENOSIS OF LEFT MIDDLE CEREBRAL ARTERY: ICD-10-CM

## 2024-02-01 DIAGNOSIS — Z99.3 DEPENDENCE ON WHEELCHAIR: ICD-10-CM
